# Patient Record
Sex: MALE | Race: WHITE | NOT HISPANIC OR LATINO | Employment: OTHER | URBAN - METROPOLITAN AREA
[De-identification: names, ages, dates, MRNs, and addresses within clinical notes are randomized per-mention and may not be internally consistent; named-entity substitution may affect disease eponyms.]

---

## 2020-08-13 ENCOUNTER — HOSPITAL ENCOUNTER (INPATIENT)
Facility: HOSPITAL | Age: 65
LOS: 7 days | Discharge: NURSING, REHAB OR LONG-TERM CARE | End: 2020-08-20
Attending: EMERGENCY MEDICINE | Admitting: INTERNAL MEDICINE

## 2020-08-13 ENCOUNTER — APPOINTMENT (OUTPATIENT)
Dept: CT IMAGING | Facility: HOSPITAL | Age: 65
End: 2020-08-13

## 2020-08-13 DIAGNOSIS — I71.21 ASCENDING AORTIC ANEURYSM (HCC): ICD-10-CM

## 2020-08-13 DIAGNOSIS — J96.02 ACUTE RESPIRATORY FAILURE WITH HYPOXIA AND HYPERCAPNIA (HCC): ICD-10-CM

## 2020-08-13 DIAGNOSIS — U07.1 COVID-19: Primary | ICD-10-CM

## 2020-08-13 DIAGNOSIS — J96.01 ACUTE RESPIRATORY FAILURE WITH HYPOXIA AND HYPERCAPNIA (HCC): ICD-10-CM

## 2020-08-13 PROBLEM — E55.9 VITAMIN D DEFICIENCY: Chronic | Status: ACTIVE | Noted: 2020-08-13

## 2020-08-13 PROBLEM — E66.01 OBESITY, MORBID, BMI 50 OR HIGHER (HCC): Chronic | Status: ACTIVE | Noted: 2020-08-13

## 2020-08-13 PROBLEM — G62.9 POLYNEUROPATHY: Chronic | Status: ACTIVE | Noted: 2020-08-13

## 2020-08-13 PROBLEM — I10 ESSENTIAL HYPERTENSION: Chronic | Status: ACTIVE | Noted: 2020-08-13

## 2020-08-13 PROBLEM — R06.02 SHORTNESS OF BREATH: Status: ACTIVE | Noted: 2020-08-13

## 2020-08-13 PROBLEM — F31.9 BIPOLAR 1 DISORDER (HCC): Chronic | Status: ACTIVE | Noted: 2020-08-13

## 2020-08-13 PROBLEM — I89.0 LYMPHEDEMA: Chronic | Status: ACTIVE | Noted: 2020-08-13

## 2020-08-13 LAB
ALBUMIN SERPL-MCNC: 3.5 G/DL (ref 3.5–5.2)
ALBUMIN/GLOB SERPL: 1.2 G/DL
ALP SERPL-CCNC: 40 U/L (ref 39–117)
ALT SERPL W P-5'-P-CCNC: 21 U/L (ref 1–41)
ANION GAP SERPL CALCULATED.3IONS-SCNC: 6 MMOL/L (ref 5–15)
APTT PPP: 29.3 SECONDS (ref 24–31)
ARTERIAL PATENCY WRIST A: POSITIVE
ARTERIAL PATENCY WRIST A: POSITIVE
AST SERPL-CCNC: 19 U/L (ref 1–40)
ATMOSPHERIC PRESS: ABNORMAL MM[HG]
ATMOSPHERIC PRESS: ABNORMAL MM[HG]
B PARAPERT DNA SPEC QL NAA+PROBE: NOT DETECTED
B PERT DNA SPEC QL NAA+PROBE: NOT DETECTED
BASE EXCESS BLDA CALC-SCNC: 8.1 MMOL/L (ref 0–3)
BASE EXCESS BLDA CALC-SCNC: 9.6 MMOL/L (ref 0–3)
BASOPHILS # BLD AUTO: 0.1 10*3/MM3 (ref 0–0.2)
BASOPHILS NFR BLD AUTO: 1.5 % (ref 0–1.5)
BDY SITE: ABNORMAL
BDY SITE: ABNORMAL
BILIRUB SERPL-MCNC: 0.4 MG/DL (ref 0–1.2)
BUN SERPL-MCNC: 17 MG/DL (ref 8–23)
BUN SERPL-MCNC: ABNORMAL MG/DL
BUN/CREAT SERPL: ABNORMAL
C PNEUM DNA NPH QL NAA+NON-PROBE: NOT DETECTED
CALCIUM SPEC-SCNC: 8.3 MG/DL (ref 8.6–10.5)
CHLORIDE SERPL-SCNC: 99 MMOL/L (ref 98–107)
CK SERPL-CCNC: 45 U/L (ref 20–200)
CO2 BLDA-SCNC: 41.8 MMOL/L (ref 22–29)
CO2 BLDA-SCNC: 43.1 MMOL/L (ref 22–29)
CO2 SERPL-SCNC: 36 MMOL/L (ref 22–29)
CREAT SERPL-MCNC: 0.78 MG/DL (ref 0.76–1.27)
CRP SERPL-MCNC: 0.47 MG/DL (ref 0–0.5)
D DIMER PPP FEU-MCNC: 1.19 MG/L (FEU) (ref 0–0.59)
DEPRECATED RDW RBC AUTO: 45.5 FL (ref 37–54)
EOSINOPHIL # BLD AUTO: 0.2 10*3/MM3 (ref 0–0.4)
EOSINOPHIL NFR BLD AUTO: 3.3 % (ref 0.3–6.2)
ERYTHROCYTE [DISTWIDTH] IN BLOOD BY AUTOMATED COUNT: 13.9 % (ref 12.3–15.4)
FERRITIN SERPL-MCNC: 59.8 NG/ML (ref 30–400)
FLUAV H1 2009 PAND RNA NPH QL NAA+PROBE: NOT DETECTED
FLUAV H1 HA GENE NPH QL NAA+PROBE: NOT DETECTED
FLUAV H3 RNA NPH QL NAA+PROBE: NOT DETECTED
FLUAV SUBTYP SPEC NAA+PROBE: NOT DETECTED
FLUBV RNA ISLT QL NAA+PROBE: NOT DETECTED
GFR SERPL CREATININE-BSD FRML MDRD: 100 ML/MIN/1.73
GLOBULIN UR ELPH-MCNC: 3 GM/DL
GLUCOSE BLDC GLUCOMTR-MCNC: 114 MG/DL (ref 70–105)
GLUCOSE BLDC GLUCOMTR-MCNC: 126 MG/DL (ref 70–105)
GLUCOSE BLDC GLUCOMTR-MCNC: 132 MG/DL (ref 70–105)
GLUCOSE BLDC GLUCOMTR-MCNC: 133 MG/DL (ref 70–105)
GLUCOSE SERPL-MCNC: 97 MG/DL (ref 65–99)
HADV DNA SPEC NAA+PROBE: NOT DETECTED
HBA1C MFR BLD: 5.6 % (ref 3.5–5.6)
HCO3 BLDA-SCNC: 39.3 MMOL/L (ref 21–28)
HCO3 BLDA-SCNC: 40.6 MMOL/L (ref 21–28)
HCOV 229E RNA SPEC QL NAA+PROBE: NOT DETECTED
HCOV HKU1 RNA SPEC QL NAA+PROBE: NOT DETECTED
HCOV NL63 RNA SPEC QL NAA+PROBE: NOT DETECTED
HCOV OC43 RNA SPEC QL NAA+PROBE: NOT DETECTED
HCT VFR BLD AUTO: 47.5 % (ref 37.5–51)
HEMODILUTION: NO
HEMODILUTION: NO
HGB BLD-MCNC: 15.4 G/DL (ref 13–17.7)
HMPV RNA NPH QL NAA+NON-PROBE: NOT DETECTED
HPIV1 RNA SPEC QL NAA+PROBE: NOT DETECTED
HPIV2 RNA SPEC QL NAA+PROBE: NOT DETECTED
HPIV3 RNA NPH QL NAA+PROBE: NOT DETECTED
HPIV4 P GENE NPH QL NAA+PROBE: NOT DETECTED
INHALED O2 CONCENTRATION: 36 %
INHALED O2 CONCENTRATION: 60 %
INR PPP: 1 (ref 0.93–1.1)
INSPIRATORY TIME: 1
L PNEUMO1 AG UR QL IA: NEGATIVE
LDH SERPL-CCNC: 205 U/L (ref 135–225)
LYMPHOCYTES # BLD AUTO: 1.5 10*3/MM3 (ref 0.7–3.1)
LYMPHOCYTES NFR BLD AUTO: 27.1 % (ref 19.6–45.3)
M PNEUMO IGG SER IA-ACNC: NOT DETECTED
MCH RBC QN AUTO: 30.1 PG (ref 26.6–33)
MCHC RBC AUTO-ENTMCNC: 32.5 G/DL (ref 31.5–35.7)
MCV RBC AUTO: 92.7 FL (ref 79–97)
MODALITY: ABNORMAL
MODALITY: ABNORMAL
MONOCYTES # BLD AUTO: 0.4 10*3/MM3 (ref 0.1–0.9)
MONOCYTES NFR BLD AUTO: 7.2 % (ref 5–12)
NEUTROPHILS NFR BLD AUTO: 3.4 10*3/MM3 (ref 1.7–7)
NEUTROPHILS NFR BLD AUTO: 60.9 % (ref 42.7–76)
NRBC BLD AUTO-RTO: 0.1 /100 WBC (ref 0–0.2)
NT-PROBNP SERPL-MCNC: 37.2 PG/ML (ref 0–900)
NT-PROBNP SERPL-MCNC: 40.4 PG/ML (ref 0–900)
PCO2 BLDA: 81.1 MM HG (ref 35–48)
PCO2 BLDA: 82.7 MM HG (ref 35–48)
PH BLDA: 7.29 PH UNITS (ref 7.35–7.45)
PH BLDA: 7.31 PH UNITS (ref 7.35–7.45)
PLATELET # BLD AUTO: 226 10*3/MM3 (ref 140–450)
PMV BLD AUTO: 8.2 FL (ref 6–12)
PO2 BLDA: 86.8 MM HG (ref 83–108)
PO2 BLDA: 94.8 MM HG (ref 83–108)
POTASSIUM SERPL-SCNC: 4.6 MMOL/L (ref 3.5–5.2)
PROT SERPL-MCNC: 6.5 G/DL (ref 6–8.5)
PROTHROMBIN TIME: 10.9 SECONDS (ref 9.6–11.7)
RBC # BLD AUTO: 5.12 10*6/MM3 (ref 4.14–5.8)
RESPIRATORY RATE: 24
RHINOVIRUS RNA SPEC NAA+PROBE: NOT DETECTED
RSV RNA NPH QL NAA+NON-PROBE: NOT DETECTED
S PNEUM AG SPEC QL LA: NEGATIVE
SAO2 % BLDCOA: 94.8 % (ref 94–98)
SAO2 % BLDCOA: 95.7 % (ref 94–98)
SARS-COV-2 RNA PNL SPEC NAA+PROBE: DETECTED
SODIUM SERPL-SCNC: 141 MMOL/L (ref 136–145)
TROPONIN T SERPL-MCNC: <0.01 NG/ML (ref 0–0.03)
VENTILATOR MODE: ABNORMAL
WBC # BLD AUTO: 5.7 10*3/MM3 (ref 3.4–10.8)

## 2020-08-13 PROCEDURE — 94640 AIRWAY INHALATION TREATMENT: CPT

## 2020-08-13 PROCEDURE — 94660 CPAP INITIATION&MGMT: CPT

## 2020-08-13 PROCEDURE — 83880 ASSAY OF NATRIURETIC PEPTIDE: CPT | Performed by: INTERNAL MEDICINE

## 2020-08-13 PROCEDURE — 83036 HEMOGLOBIN GLYCOSYLATED A1C: CPT | Performed by: NURSE PRACTITIONER

## 2020-08-13 PROCEDURE — 94799 UNLISTED PULMONARY SVC/PX: CPT

## 2020-08-13 PROCEDURE — 85025 COMPLETE CBC W/AUTO DIFF WBC: CPT | Performed by: EMERGENCY MEDICINE

## 2020-08-13 PROCEDURE — 36600 WITHDRAWAL OF ARTERIAL BLOOD: CPT

## 2020-08-13 PROCEDURE — 87899 AGENT NOS ASSAY W/OPTIC: CPT | Performed by: NURSE PRACTITIONER

## 2020-08-13 PROCEDURE — 84484 ASSAY OF TROPONIN QUANT: CPT | Performed by: EMERGENCY MEDICINE

## 2020-08-13 PROCEDURE — 82803 BLOOD GASES ANY COMBINATION: CPT

## 2020-08-13 PROCEDURE — 25010000002 FUROSEMIDE PER 20 MG: Performed by: NURSE PRACTITIONER

## 2020-08-13 PROCEDURE — 25010000002 DEXAMETHASONE PER 1 MG: Performed by: EMERGENCY MEDICINE

## 2020-08-13 PROCEDURE — 0202U NFCT DS 22 TRGT SARS-COV-2: CPT | Performed by: INTERNAL MEDICINE

## 2020-08-13 PROCEDURE — 82550 ASSAY OF CK (CPK): CPT | Performed by: NURSE PRACTITIONER

## 2020-08-13 PROCEDURE — 83880 ASSAY OF NATRIURETIC PEPTIDE: CPT | Performed by: EMERGENCY MEDICINE

## 2020-08-13 PROCEDURE — 71275 CT ANGIOGRAPHY CHEST: CPT

## 2020-08-13 PROCEDURE — 0 IOPAMIDOL PER 1 ML: Performed by: EMERGENCY MEDICINE

## 2020-08-13 PROCEDURE — 99223 1ST HOSP IP/OBS HIGH 75: CPT | Performed by: INTERNAL MEDICINE

## 2020-08-13 PROCEDURE — 99284 EMERGENCY DEPT VISIT MOD MDM: CPT

## 2020-08-13 PROCEDURE — 25010000002 ENOXAPARIN PER 10 MG: Performed by: NURSE PRACTITIONER

## 2020-08-13 PROCEDURE — 82728 ASSAY OF FERRITIN: CPT | Performed by: NURSE PRACTITIONER

## 2020-08-13 PROCEDURE — 85730 THROMBOPLASTIN TIME PARTIAL: CPT | Performed by: EMERGENCY MEDICINE

## 2020-08-13 PROCEDURE — 80053 COMPREHEN METABOLIC PANEL: CPT | Performed by: EMERGENCY MEDICINE

## 2020-08-13 PROCEDURE — 83615 LACTATE (LD) (LDH) ENZYME: CPT | Performed by: NURSE PRACTITIONER

## 2020-08-13 PROCEDURE — 85379 FIBRIN DEGRADATION QUANT: CPT | Performed by: EMERGENCY MEDICINE

## 2020-08-13 PROCEDURE — 86140 C-REACTIVE PROTEIN: CPT | Performed by: NURSE PRACTITIONER

## 2020-08-13 PROCEDURE — 85610 PROTHROMBIN TIME: CPT | Performed by: EMERGENCY MEDICINE

## 2020-08-13 PROCEDURE — 82962 GLUCOSE BLOOD TEST: CPT

## 2020-08-13 PROCEDURE — 93005 ELECTROCARDIOGRAM TRACING: CPT

## 2020-08-13 RX ORDER — CYCLOBENZAPRINE HCL 5 MG
5 TABLET ORAL 2 TIMES DAILY PRN
COMMUNITY

## 2020-08-13 RX ORDER — HYDRALAZINE HYDROCHLORIDE 20 MG/ML
10 INJECTION INTRAMUSCULAR; INTRAVENOUS EVERY 6 HOURS PRN
Status: DISCONTINUED | OUTPATIENT
Start: 2020-08-13 | End: 2020-08-20 | Stop reason: HOSPADM

## 2020-08-13 RX ORDER — FUROSEMIDE 10 MG/ML
40 INJECTION INTRAMUSCULAR; INTRAVENOUS DAILY
Status: DISCONTINUED | OUTPATIENT
Start: 2020-08-13 | End: 2020-08-15

## 2020-08-13 RX ORDER — AMLODIPINE BESYLATE 10 MG/1
10 TABLET ORAL DAILY
COMMUNITY

## 2020-08-13 RX ORDER — ZINC OXIDE 20 %
OINTMENT (GRAM) TOPICAL DAILY
COMMUNITY

## 2020-08-13 RX ORDER — ACETAMINOPHEN 325 MG/1
650 TABLET ORAL EVERY 4 HOURS PRN
Status: DISCONTINUED | OUTPATIENT
Start: 2020-08-13 | End: 2020-08-20 | Stop reason: HOSPADM

## 2020-08-13 RX ORDER — MAGNESIUM SULFATE HEPTAHYDRATE 40 MG/ML
2 INJECTION, SOLUTION INTRAVENOUS AS NEEDED
Status: DISCONTINUED | OUTPATIENT
Start: 2020-08-13 | End: 2020-08-20 | Stop reason: HOSPADM

## 2020-08-13 RX ORDER — ACETAMINOPHEN 160 MG/5ML
650 SOLUTION ORAL EVERY 4 HOURS PRN
Status: DISCONTINUED | OUTPATIENT
Start: 2020-08-13 | End: 2020-08-20 | Stop reason: HOSPADM

## 2020-08-13 RX ORDER — FUROSEMIDE 20 MG/1
20 TABLET ORAL DAILY
COMMUNITY

## 2020-08-13 RX ORDER — ALBUTEROL SULFATE 90 UG/1
2 AEROSOL, METERED RESPIRATORY (INHALATION)
Status: DISCONTINUED | OUTPATIENT
Start: 2020-08-13 | End: 2020-08-20 | Stop reason: HOSPADM

## 2020-08-13 RX ORDER — ALUMINA, MAGNESIA, AND SIMETHICONE 2400; 2400; 240 MG/30ML; MG/30ML; MG/30ML
15 SUSPENSION ORAL EVERY 6 HOURS PRN
Status: DISCONTINUED | OUTPATIENT
Start: 2020-08-13 | End: 2020-08-20 | Stop reason: HOSPADM

## 2020-08-13 RX ORDER — DIVALPROEX SODIUM 125 MG/1
750 TABLET, DELAYED RELEASE ORAL 3 TIMES DAILY
COMMUNITY

## 2020-08-13 RX ORDER — POTASSIUM CHLORIDE 7.45 MG/ML
10 INJECTION INTRAVENOUS
Status: DISCONTINUED | OUTPATIENT
Start: 2020-08-13 | End: 2020-08-20 | Stop reason: HOSPADM

## 2020-08-13 RX ORDER — NICOTINE POLACRILEX 4 MG
15 LOZENGE BUCCAL
Status: DISCONTINUED | OUTPATIENT
Start: 2020-08-13 | End: 2020-08-20 | Stop reason: HOSPADM

## 2020-08-13 RX ORDER — CHOLECALCIFEROL (VITAMIN D3) 125 MCG
5 CAPSULE ORAL NIGHTLY PRN
Status: DISCONTINUED | OUTPATIENT
Start: 2020-08-13 | End: 2020-08-20 | Stop reason: HOSPADM

## 2020-08-13 RX ORDER — NITROGLYCERIN 0.4 MG/1
0.4 TABLET SUBLINGUAL
Status: DISCONTINUED | OUTPATIENT
Start: 2020-08-13 | End: 2020-08-20 | Stop reason: HOSPADM

## 2020-08-13 RX ORDER — LISINOPRIL 40 MG/1
40 TABLET ORAL DAILY
COMMUNITY

## 2020-08-13 RX ORDER — KETOROLAC TROMETHAMINE 15 MG/ML
15 INJECTION, SOLUTION INTRAMUSCULAR; INTRAVENOUS EVERY 6 HOURS PRN
Status: ACTIVE | OUTPATIENT
Start: 2020-08-13 | End: 2020-08-15

## 2020-08-13 RX ORDER — ONDANSETRON 4 MG/1
4 TABLET, FILM COATED ORAL EVERY 6 HOURS PRN
Status: DISCONTINUED | OUTPATIENT
Start: 2020-08-13 | End: 2020-08-20 | Stop reason: HOSPADM

## 2020-08-13 RX ORDER — QUETIAPINE 200 MG/1
200 TABLET, FILM COATED, EXTENDED RELEASE ORAL NIGHTLY
COMMUNITY
End: 2020-08-20 | Stop reason: HOSPADM

## 2020-08-13 RX ORDER — METFORMIN HYDROCHLORIDE 500 MG/1
500 TABLET, EXTENDED RELEASE ORAL
COMMUNITY

## 2020-08-13 RX ORDER — DEXAMETHASONE SODIUM PHOSPHATE 4 MG/ML
6 INJECTION, SOLUTION INTRA-ARTICULAR; INTRALESIONAL; INTRAMUSCULAR; INTRAVENOUS; SOFT TISSUE ONCE
Status: COMPLETED | OUTPATIENT
Start: 2020-08-13 | End: 2020-08-13

## 2020-08-13 RX ORDER — SODIUM CHLORIDE 0.9 % (FLUSH) 0.9 %
10 SYRINGE (ML) INJECTION EVERY 12 HOURS SCHEDULED
Status: DISCONTINUED | OUTPATIENT
Start: 2020-08-13 | End: 2020-08-20 | Stop reason: HOSPADM

## 2020-08-13 RX ORDER — GABAPENTIN 600 MG/1
600 TABLET ORAL 3 TIMES DAILY
COMMUNITY

## 2020-08-13 RX ORDER — BISACODYL 10 MG
10 SUPPOSITORY, RECTAL RECTAL DAILY PRN
Status: DISCONTINUED | OUTPATIENT
Start: 2020-08-13 | End: 2020-08-20 | Stop reason: HOSPADM

## 2020-08-13 RX ORDER — ONDANSETRON 2 MG/ML
4 INJECTION INTRAMUSCULAR; INTRAVENOUS EVERY 6 HOURS PRN
Status: DISCONTINUED | OUTPATIENT
Start: 2020-08-13 | End: 2020-08-20 | Stop reason: HOSPADM

## 2020-08-13 RX ORDER — ZINC OXIDE 20 %
OINTMENT (GRAM) TOPICAL DAILY
Status: DISCONTINUED | OUTPATIENT
Start: 2020-08-13 | End: 2020-08-14

## 2020-08-13 RX ORDER — BUDESONIDE AND FORMOTEROL FUMARATE DIHYDRATE 160; 4.5 UG/1; UG/1
2 AEROSOL RESPIRATORY (INHALATION)
Status: DISCONTINUED | OUTPATIENT
Start: 2020-08-13 | End: 2020-08-20 | Stop reason: HOSPADM

## 2020-08-13 RX ORDER — SODIUM CHLORIDE 0.9 % (FLUSH) 0.9 %
10 SYRINGE (ML) INJECTION AS NEEDED
Status: DISCONTINUED | OUTPATIENT
Start: 2020-08-13 | End: 2020-08-20 | Stop reason: HOSPADM

## 2020-08-13 RX ORDER — ACETAMINOPHEN 650 MG/1
650 SUPPOSITORY RECTAL EVERY 4 HOURS PRN
Status: DISCONTINUED | OUTPATIENT
Start: 2020-08-13 | End: 2020-08-20 | Stop reason: HOSPADM

## 2020-08-13 RX ORDER — CELECOXIB 100 MG/1
50 CAPSULE ORAL 2 TIMES DAILY
COMMUNITY

## 2020-08-13 RX ORDER — DEXTROSE MONOHYDRATE 25 G/50ML
25 INJECTION, SOLUTION INTRAVENOUS
Status: DISCONTINUED | OUTPATIENT
Start: 2020-08-13 | End: 2020-08-20 | Stop reason: HOSPADM

## 2020-08-13 RX ORDER — POTASSIUM CHLORIDE 1.5 G/1.77G
40 POWDER, FOR SOLUTION ORAL AS NEEDED
Status: DISCONTINUED | OUTPATIENT
Start: 2020-08-13 | End: 2020-08-20 | Stop reason: HOSPADM

## 2020-08-13 RX ORDER — QUETIAPINE FUMARATE 100 MG/1
200 TABLET, FILM COATED ORAL 2 TIMES DAILY
COMMUNITY

## 2020-08-13 RX ORDER — ALBUTEROL SULFATE 90 UG/1
2 AEROSOL, METERED RESPIRATORY (INHALATION) EVERY 4 HOURS PRN
Status: DISCONTINUED | OUTPATIENT
Start: 2020-08-13 | End: 2020-08-20 | Stop reason: HOSPADM

## 2020-08-13 RX ORDER — MAGNESIUM SULFATE 1 G/100ML
1 INJECTION INTRAVENOUS AS NEEDED
Status: DISCONTINUED | OUTPATIENT
Start: 2020-08-13 | End: 2020-08-20 | Stop reason: HOSPADM

## 2020-08-13 RX ORDER — DEXAMETHASONE SODIUM PHOSPHATE 10 MG/ML
6 INJECTION, SOLUTION INTRAMUSCULAR; INTRAVENOUS DAILY
Status: DISCONTINUED | OUTPATIENT
Start: 2020-08-14 | End: 2020-08-18

## 2020-08-13 RX ORDER — POTASSIUM CHLORIDE 20 MEQ/1
40 TABLET, EXTENDED RELEASE ORAL AS NEEDED
Status: DISCONTINUED | OUTPATIENT
Start: 2020-08-13 | End: 2020-08-20 | Stop reason: HOSPADM

## 2020-08-13 RX ADMIN — Medication 10 ML: at 10:42

## 2020-08-13 RX ADMIN — ALBUTEROL SULFATE 2 PUFF: 90 AEROSOL, METERED RESPIRATORY (INHALATION) at 11:50

## 2020-08-13 RX ADMIN — BUDESONIDE AND FORMOTEROL FUMARATE DIHYDRATE 2 PUFF: 160; 4.5 AEROSOL RESPIRATORY (INHALATION) at 20:30

## 2020-08-13 RX ADMIN — IOPAMIDOL 100 ML: 755 INJECTION, SOLUTION INTRAVENOUS at 04:30

## 2020-08-13 RX ADMIN — BUDESONIDE AND FORMOTEROL FUMARATE DIHYDRATE 2 PUFF: 160; 4.5 AEROSOL RESPIRATORY (INHALATION) at 11:50

## 2020-08-13 RX ADMIN — Medication 10 ML: at 22:05

## 2020-08-13 RX ADMIN — ZINC OXIDE: 200 OINTMENT TOPICAL at 22:06

## 2020-08-13 RX ADMIN — FUROSEMIDE 40 MG: 10 INJECTION, SOLUTION INTRAMUSCULAR; INTRAVENOUS at 10:42

## 2020-08-13 RX ADMIN — ENOXAPARIN SODIUM 90 MG: 100 INJECTION SUBCUTANEOUS at 10:41

## 2020-08-13 RX ADMIN — ALBUTEROL SULFATE 2 PUFF: 90 AEROSOL, METERED RESPIRATORY (INHALATION) at 19:00

## 2020-08-13 RX ADMIN — DEXAMETHASONE SODIUM PHOSPHATE 6 MG: 4 INJECTION, SOLUTION INTRAMUSCULAR; INTRAVENOUS at 05:36

## 2020-08-13 RX ADMIN — ENOXAPARIN SODIUM 90 MG: 100 INJECTION SUBCUTANEOUS at 22:03

## 2020-08-13 RX ADMIN — ALBUTEROL SULFATE 2 PUFF: 90 AEROSOL, METERED RESPIRATORY (INHALATION) at 16:04

## 2020-08-13 NOTE — CONSULTS
PULMONARY/ CRITICAL CARE/ SLEEP MEDICINE CONSULT NOTE        Patient Name:  Fox Ramirez    :  1955    Medical Record:  [id number]    REQUESTING PHYSICIAN    Steve Valerio MD    PRIMARY CARE PHYSICIAN     Steve Valerio MD    REASON FOR CONSULTATION    Fox Ramirez is a 65 y.o. male who was referred for consultation for shortness of breath and respiratory failure.  Patient is a morbidly obese male with remote history of tobacco use but he denies any previous history of COPD or emphysema.  Patient was recently diagnosed with COVID-19 infection and in [place] a few weeks ago.  He subsequently was transferred to a local nursing home.  Patient was noted to have some low O2 sats yesterday.  He was placed on 3 L nasal cannula and was subsequently transferred to the ER for further evaluation.  Patient was evaluated in the ER.  He required BiPAP therapy and was admitted to the hospital.  I have been asked to see him for further evaluation.  At time of my evaluation patient awakens easily.  He does complain of some shortness of breath.  He does complain of some cough.  He does complain of some nausea and diarrhea.  He denies any chest pain or palpitations.  He denies any vomiting.  His shortness of breath gets worse with any activity.  It gets better with resting and use of supplemental oxygen    REVIEW OF SYSTEMS    Constitutional:  Denies fever or chills   Eyes:  Denies change in visual acuity   HENT:  Denies nasal congestion or sore throat   Respiratory:  + cough or shortness of breath   Cardiovascular:  Denies chest pain + edema   GI:  Denies abdominal pain,vomiting, bloody stools+ diarrhea   :  Denies dysuria   Musculoskeletal:  +back pain + joint pain   Integument:  Denies rash   Neurologic:  Denies headache, focal weakness or sensory changes   Endocrine:  Denies polyuria or polydipsia   Lymphatic:  Denies swollen glands   Psychiatric:  Denies depression or anxiety     MEDICAL HISTORY    Past  Medical History:   Diagnosis Date   • Bipolar 1 disorder (CMS/[facility])    • Hypertension    • Lymphedema    • Obesity, morbid, BMI 50 or higher (CMS/[facility]) 2020   • Polyneuropathy    • Vitamin D deficiency         SURGICAL HISTORY    History reviewed. No pertinent surgical history.     FAMILY HISTORY    Family History   Family history unknown: Yes       SOCIAL HISTORY    Social History     Tobacco Use   • Smoking status: Unknown If Ever Smoked   Substance Use Topics   • Alcohol use: Defer        ALLERGIES    Allergies   Allergen Reactions   • Morphine Nausea And Vomiting       MEDICATIONS    Scheduled Meds:  albuterol sulfate HFA 2 puff Inhalation 4x Daily - RT   budesonide-formoterol 2 puff Inhalation BID - RT   [START ON 2020] dexamethasone 6 mg Intravenous Daily   enoxaparin 0.5 mg/kg Subcutaneous Q12H   furosemide 40 mg Intravenous Daily   insulin lispro 0-9 Units Subcutaneous Q6H   sodium chloride 10 mL Intravenous Q12H   zinc oxide  Topical Daily     Continuous Infusions:  Pharmacy Consult - Steroid Insulin Protocol      PRN Meds:.•  acetaminophen **OR** acetaminophen **OR** acetaminophen  •  albuterol sulfate HFA  •  aluminum-magnesium hydroxide-simethicone  •  bisacodyl  •  dextrose  •  dextrose  •  glucagon (human recombinant)  •  hydrALAZINE  •  insulin lispro **AND** insulin lispro  •  ketorolac  •  magnesium hydroxide  •  magnesium sulfate **OR** magnesium sulfate in D5W 1g/100mL (PREMIX)  •  melatonin  •  nitroglycerin  •  ondansetron **OR** ondansetron  •  Pharmacy Consult - Steroid Insulin Protocol  •  potassium chloride **OR** potassium chloride **OR** potassium chloride  •  sodium chloride      PHYSICAL EXAM    tMax 24 hrs:  Temp (24hrs), Av.8 °F (36.6 °C), Min:97.1 °F (36.2 °C), Max:98.4 °F (36.9 °C)    Vitals Ranges:  Temp:  [97.1 °F (36.2 °C)-98.4 °F (36.9 °C)] 97.1 °F (36.2 °C)  Heart Rate:  [65-80] 65  Resp:  [16-20] 18  BP: (124-139)/(61-78) 125/78  Intake and Output Last 3 Shifts:  No  intake/output data recorded.    Constitutional: Morbidly obese, chronically ill-appearing, no acute distress, non-toxic appearance   Eyes:   conjunctiva normal   HENT:  Atraumatic, external ears normal, nose normal, oropharynx moist, no pharyngeal exudates.   Neck- normal range of motion, no tenderness, supple   Respiratory:  No respiratory distress, normal breath sounds, no rales, no wheezing   Cardiovascular:  Normal rate, normal rhythm, no murmurs, no gallops, no rubs   GI:  Soft, nondistended, normal bowel sounds, nontender, no organomegaly, no mass, no rebound, no guarding   :  No costovertebral angle tenderness   Musculoskeletal:  + edema, no tenderness, no deformities. Back- no tenderness  Integument:  Well hydrated, no rash   Lymphatic:  No lymphadenopathy noted   Neurologic: Awake, CN 2-12 normal, normal motor function, normal sensory function, no focal deficits noted   Psychiatric:  Speech and behavior appropriate     LABS    Lab Results (last 24 hours)     Procedure Component Value Units Date/Time    POC Glucose Once [[id number]]  (Abnormal) Collected:  08/13/20 0852    Specimen:  Blood Updated:  08/13/20 0853     Glucose 114 mg/dL      Comment: Serial Number: [id number]:  [id number]       Hemoglobin A1c [[id number]] Collected:  08/13/20 0318    Specimen:  Blood Updated:  08/13/20 0852    Blood Gas, Arterial [[id number]]  (Abnormal) Collected:  08/13/20 0815    Specimen:  Arterial Blood Updated:  08/13/20 0825     Site Right Radial     Janes's Test Positive     pH, Arterial 7.285 pH units      pCO2, Arterial 82.7 mm Hg      pO2, Arterial 94.8 mm Hg      HCO3, Arterial 39.3 mmol/L      Base Excess, Arterial 8.1 mmol/L      Comment: Serial Number: [id number]:  [id number]        O2 Saturation, Arterial 95.7 %      CO2 Content 41.8 mmol/L      Barometric Pressure for Blood Gas --     Comment: N/A        Modality BiPap     FIO2 60 %      Ventilator Mode ;BiLevel     Hemodilution No     Respiratory Rate 24       Inspiratory Time 1    Ferritin [[id number]]  (Normal) Collected:  08/13/20 0318    Specimen:  Blood Updated:  08/13/20 0805     Ferritin 59.80 ng/mL     Narrative:       Results may be falsely decreased if patient taking Biotin.      CK [[id number]]  (Normal) Collected:  08/13/20 0318    Specimen:  Blood Updated:  08/13/20 0754     Creatine Kinase 45 U/L     Lactate Dehydrogenase [[id number]] Collected:  08/13/20 0318    Specimen:  Blood Updated:  08/13/20 0740    C-reactive Protein [[id number]] Collected:  08/13/20 0318    Specimen:  Blood Updated:  08/13/20 0740    BNP [[id number]]  (Normal) Collected:  08/13/20 0318    Specimen:  Blood Updated:  08/13/20 0358     proBNP 40.4 pg/mL     Narrative:       Among patients with dyspnea, NT-proBNP is highly sensitive for the detection of acute congestive heart failure. In addition NT-proBNP of <300 pg/ml effectively rules out acute congestive heart failure with 99% negative predictive value.    Results may be falsely decreased if patient taking Biotin.      Troponin [[id number]]  (Normal) Collected:  08/13/20 0318    Specimen:  Blood Updated:  08/13/20 0358     Troponin T <0.010 ng/mL     Narrative:       Troponin T Reference Range:  <= 0.03 ng/mL-   Negative for AMI  >0.03 ng/mL-     Abnormal for myocardial necrosis.  Clinicians would have to utilize clinical acumen, EKG, Troponin and serial changes to determine if it is an Acute Myocardial Infarction or myocardial injury due to an underlying chronic condition.       Results may be falsely decreased if patient taking Biotin.      BUN [[id number]]  (Normal) Collected:  08/13/20 0318    Specimen:  Blood Updated:  08/13/20 0356     BUN 17 mg/dL     Blood Gas, Arterial [[id number]]  (Abnormal) Collected:  08/13/20 0343    Specimen:  Arterial Blood Updated:  08/13/20 0355     Site Right Radial     Janes's Test Positive     pH, Arterial 7.308 pH units      pCO2, Arterial 81.1 mm Hg      pO2, Arterial 86.8 mm Hg      HCO3,  Arterial 40.6 mmol/L      Base Excess, Arterial 9.6 mmol/L      Comment: Serial Number: [id number]:  [id number]        O2 Saturation, Arterial 94.8 %      CO2 Content 43.1 mmol/L      Barometric Pressure for Blood Gas --     Comment: N/A        Modality Cannula     FIO2 36 %      Hemodilution No    Comprehensive Metabolic Panel [[id number]]  (Abnormal) Collected:  08/13/20 0318    Specimen:  Blood Updated:  08/13/20 0354     Glucose 97 mg/dL      BUN --     Comment: Testing performed by alternate method        Creatinine 0.78 mg/dL      Sodium 141 mmol/L      Potassium 4.6 mmol/L      Chloride 99 mmol/L      CO2 36.0 mmol/L      Calcium 8.3 mg/dL      Total Protein 6.5 g/dL      Albumin 3.50 g/dL      ALT (SGPT) 21 U/L      AST (SGOT) 19 U/L      Alkaline Phosphatase 40 U/L      Total Bilirubin 0.4 mg/dL      eGFR Non African Amer 100 mL/min/1.73      Globulin 3.0 gm/dL      A/G Ratio 1.2 g/dL      BUN/Creatinine Ratio --     Comment: Testing not performed        Anion Gap 6.0 mmol/L     Narrative:       GFR Normal >60  Chronic Kidney Disease <60  Kidney Failure <15      Protime-INR [[id number]]  (Normal) Collected:  08/13/20 0318    Specimen:  Blood Updated:  08/13/20 0340     Protime 10.9 Seconds      INR 1.00    aPTT [[id number]]  (Normal) Collected:  08/13/20 0318    Specimen:  Blood Updated:  08/13/20 0340     PTT 29.3 seconds     D-dimer, Quantitative [[id number]]  (Abnormal) Collected:  08/13/20 0318    Specimen:  Blood Updated:  08/13/20 0340     D-Dimer, Quantitative 1.19 mg/L (FEU)     Narrative:       Reference Range  --------------------------------------------------------------------     < 0.50   Negative Predictive Value  0.50-0.59   Indeterminate    >= 0.60   Probable VTE             A very low percentage of patients with DVT may yield D-Dimer results   below the cut-off of 0.50 mg/L FEU.  This is known to be more   prevalent in patients with distal DVT.             Results of this test should always be  interpreted in conjunction with   the patient's medical history, clinical presentation and other   findings.  Clinical diagnosis should not be based on the result of   [place] D-Dimer alone.    CBC & Differential [[id number]] Collected:  08/13/20 0318    Specimen:  Blood Updated:  08/13/20 0333    Narrative:       The following orders were created for panel order CBC & Differential.  Procedure                               Abnormality         Status                     ---------                               -----------         ------                     CBC Auto Differential[[id number]]        Normal              Final result                 Please view results for these tests on the individual orders.    CBC Auto Differential [[id number]]  (Normal) Collected:  08/13/20 0318    Specimen:  Blood Updated:  08/13/20 0333     WBC 5.70 10*3/mm3      RBC 5.12 10*6/mm3      Hemoglobin 15.4 g/dL      Hematocrit 47.5 %      MCV 92.7 fL      MCH 30.1 pg      MCHC 32.5 g/dL      RDW 13.9 %      RDW-SD 45.5 fl      MPV 8.2 fL      Platelets 226 10*3/mm3      Neutrophil % 60.9 %      Lymphocyte % 27.1 %      Monocyte % 7.2 %      Eosinophil % 3.3 %      Basophil % 1.5 %      Neutrophils, Absolute 3.40 10*3/mm3      Lymphocytes, Absolute 1.50 10*3/mm3      Monocytes, Absolute 0.40 10*3/mm3      Eosinophils, Absolute 0.20 10*3/mm3      Basophils, Absolute 0.10 10*3/mm3      nRBC 0.1 /100 WBC          Microbiology Results (last 10 days)     ** No results found for the last 240 hours. **         CBC  Results from last 7 days   Lab Units 08/13/20 0318   WBC 10*3/mm3 5.70   RBC 10*6/mm3 5.12   HEMOGLOBIN g/dL 15.4   HEMATOCRIT % 47.5   MCV fL 92.7   PLATELETS 10*3/mm3 226       BMP  Results from last 7 days   Lab Units 08/13/20 0318   SODIUM mmol/L 141   POTASSIUM mmol/L 4.6   CHLORIDE mmol/L 99   CO2 mmol/L 36.0*   BUN  17   CREATININE mg/dL 0.78   GLUCOSE mg/dL 97       CMP Results from last 7 days   Lab Units 08/13/20 0318    SODIUM mmol/L 141   POTASSIUM mmol/L 4.6   CHLORIDE mmol/L 99   CO2 mmol/L 36.0*   BUN  17   CREATININE mg/dL 0.78   GLUCOSE mg/dL 97   ALBUMIN g/dL 3.50   BILIRUBIN mg/dL 0.4   ALK PHOS U/L 40   AST (SGOT) U/L 19   ALT (SGPT) U/L 21       TROPONIN  Results from last 7 days   Lab Units 08/13/20  0318   CK TOTAL U/L 45   TROPONIN T ng/mL <0.010       CoAg  Results from last 7 days   Lab Units 08/13/20  0318   INR  1.00   APTT seconds 29.3       Creatinine Clearance  Estimated Creatinine Clearance: 159.7 mL/min (by C-G formula based on SCr of 0.78 mg/dL).    ABG  Results from last 7 days   Lab Units 08/13/20  0815 08/13/20  0343   PH, ARTERIAL pH units 7.285* 7.308*   PCO2, ARTERIAL mm Hg 82.7* 81.1*   PO2 ART mm Hg 94.8 86.8   O2 SATURATION ART % 95.7 94.8   BASE EXCESS ART mmol/L 8.1* 9.6*     IMAGING & OTHER STUDIES    Imaging Results (Last 72 Hours)     Procedure Component Value Units Date/Time    CT Chest Pulmonary Embolism [[id number]] Collected:  08/13/20 0245     Updated:  08/13/20 0452    Narrative:       CT ANGIOGRAM OF THE CHEST    INDICATION: Cough, shortness of breath. Coronavirus positive.    TECHNIQUE: CT scan of the chest was performed following the administration of 100 mL Isovue-370 intravenous contrast. Imaging was performed to optimize evaluation of the pulmonary arterial system. CT dose lowering techniques were used, to include:   automated exposure control, adjustment for patient size, and / or use of iterative reconstruction. MIP images were also generated.    COMPARISON: None    FINDINGS:  Vasculature: There are no filling defects in the central, lobar, proximal segmental or distal segmental pulmonary arteries. There is no evidence of right heart strain or pulmonary infarction. Coronary artery calcifications are present. There is fusiform   aneurysmal dilatation of the ascending aorta measuring 4.7 cm in diameter. The pulmonary trunk is also enlarged measuring 3.5 cm in  diameter.    Mediastinum / Pleura: There is no pericardial or pleural effusion. Borderline right hilar lymph nodes are present.    Airways / Lungs: The central airways are patent.  There is no pneumothorax or consolidative parenchymal disease. Subsegmental atelectatic changes are present in both lung bases.    Bones: No destructive osseous lesion is identified.    Upper Abdomen: Limited images below the diaphragm demonstrate no acute abnormality. Nonobstructing stones are present in each kidney.      Impression:         1. No evidence of pulmonary embolism. No evidence of an acute intrathoracic process.  2. Borderline enlarged right hilar lymph nodes, nonspecific.  3. Fusiform ascending aortic aneurysm measuring 4.7 cm in diameter.  4. Enlarged pulmonary trunk. This can be seen in the setting of pulmonary arterial hypertension.  5. Coronary artery atherosclerosis.  6. Nonobstructing stones in each kidney.    Electronically signed by:  Davey Boo M.D.    8/13/2020 2:51 AM          ASSESSMENT      Acute respiratory failure with hypoxia and hypercapnia (CMS/HCC)    COVID-19    Ascending aortic aneurysm (CMS/HCC)    Essential hypertension    Lymphedema    Vitamin D deficiency    Polyneuropathy    Bipolar 1 disorder (CMS/HCC)    Shortness of breath    Obesity, morbid, BMI 50 or higher (CMS/HCC)    PLAN    I reviewed her CT scan of the chest films.  I also reviewed his ABG.  He likely has chronic hypercapnic respiratory failure likely from his obesity hypoventilation syndrome.  We will continue with noninvasive ventilation.  I will switch his BiPAP settings to 20/4.  We will repeat ABG and adjust settings as appropriate.  His CT scan of the chest is concerning for pulmonary hypertension.  We will proceed with a 2D echo once he is more stable and hopefully after his COVID-19 testing is negative.  I will repeat his COVID-19 testing.  Check BNP level.  Agree with diuresis.  We will wean FiO2 as appropriate.  He is on  Lovenox for DVT prophylaxis.  We will make further recommendations depending on his progress.  He may benefit from trilogy noninvasive ventilation at discharge.  I thank you for this opportunity take part in this patient's care and will follow the patient along with you    This document has been electronically signed by  Jayce Newby MD  11:36 AM

## 2020-08-13 NOTE — H&P
[facility] Medicine Services      Patient Name: Fox Ramirez  : 1955  MRN: [id number]  Primary Care Physician: Steve Valerio MD  Date of admission: 2020    Patient Care Team:  Steve Valerio MD as PCP - General (Hospitalist)          Subjective   History Present Illness     Chief Complaint:   Chief Complaint   Patient presents with   • Shortness of Breath       Mr. Ramirez is a 65 y.o. morbidly obese male who was sent to the [facility] ED on 2020 from [facility] complaining of shortness of breath. The patient is from [place] and recently tested positive for COVID-19. He was sent to [facility] as they have a unit dedicated to COVID positive patients. The patient was reportedly hypoxic overnight with O2 sat mid-80s on room air. He was placed on supplemental oxygen with improvement in his saturation. Upon arrival to the ER here the patient's O2 sat was 91% on 4 liters. His ABG revealed pH 7.308, CO2 81.1, pO2 86.8, HCO3 40.6. He was placed on BiPAP. His CBC and CMP were unremarkable. His proBNP and troponin were normal. His dimer was elevated at 1.19, but his CT chest was negative for pulmonary embolism. He was also noted have an ascending aortic aneurysm measuring 4.7 cm. His EKG showed sinus rhythm. He was admitted for further evaluation and treatment.     The patient is drowsy, but easy to arouse and oriented. He is having difficulty talking while on BiPAP. Nursing home records reviewed. The patient has a history of HTN, lymphedema, Bipolar disorder, polyneuropathy, and vitamin D deficiency. He is currently on metformin at the rehab facility, but denies a history of Type 2 DM or borderline DM. He states he was not previously on metformin. The patient is currently complaining of shortness of breath. He denies any other complaints.       Review of Systems   Respiratory: Positive for shortness of breath.    All other  systems reviewed and are negative.        Personal History     Past Medical History:   Past Medical History:   Diagnosis Date   • Bipolar 1 disorder (CMS/[facility])    • Hypertension    • Lymphedema    • Obesity, morbid, BMI 50 or higher (CMS/[facility]) 8/13/2020   • Polyneuropathy    • Vitamin D deficiency        Surgical History:    History reviewed. No pertinent surgical history.    Family History: Family history is unknown by patient. Otherwise pertinent FHx was reviewed and unremarkable.     Social History: Alcohol use questions deferred to the physician. Drug use questions deferred to the physician.      Medications:  Prior to Admission medications    Medication Sig Start Date End Date Taking? Authorizing Provider   amLODIPine (NORVASC) 10 MG tablet Take 10 mg by mouth Daily.   Yes Malathi Del Rio MD   celecoxib (CeleBREX) 100 MG capsule Take 50 mg by mouth 2 (Two) Times a Day.   Yes Malathi Del Rio MD   Cholecalciferol (VITAMIN D3) 125 MCG (5000 UT) capsule capsule Take 1,000 Units by mouth Daily.   Yes Malathi Del Rio MD   cyclobenzaprine (FLEXERIL) 5 MG tablet Take 5 mg by mouth 2 (Two) Times a Day As Needed for Muscle Spasms.   Yes Malathi Del Rio MD   divalproex (DEPAKOTE) 125 MG DR tablet Take 750 mg by mouth 3 (Three) Times a Day.   Yes Malathi Del Rio MD   furosemide (LASIX) 20 MG tablet Take 20 mg by mouth Daily.   Yes Malathi Del Rio MD   gabapentin (NEURONTIN) 600 MG tablet Take 600 mg by mouth 3 (Three) Times a Day.   Yes Malathi Del Rio MD   lisinopril (PRINIVIL,ZESTRIL) 40 MG tablet Take 40 mg by mouth Daily.   Yes Malathi Del Rio MD   metFORMIN ER (GLUCOPHAGE-XR) 500 MG 24 hr tablet Take 500 mg by mouth Daily With Breakfast.   Yes Malathi Del Rio MD   QUEtiapine (SEROquel) 100 MG tablet Take 200 mg by mouth 2 (Two) Times a Day.   Yes Malathi Del Rio MD   QUEtiapine XR (SEROquel XR) 200 MG 24 hr tablet Take 200 mg by mouth Every Night.   Yes  Provider, MD Malathi   zinc oxide 20 % ointment Apply  topically to the appropriate area as directed Daily. Apply to buttocks topically daily and prn   Yes Provider, MD Malathi       Allergies:    Allergies   Allergen Reactions   • Morphine Nausea And Vomiting       Objective   Objective     Vital Signs  Temp:  [97.1 °F (36.2 °C)-98.4 °F (36.9 °C)] 97.1 °F (36.2 °C)  Heart Rate:  [65-80] 65  Resp:  [16-20] 18  BP: (124-139)/(61-78) 125/78  SpO2:  [91 %-96 %] 95 %  on  Flow (L/min):  [4] 4;   Device (Oxygen Therapy): NPPV/NIV  Body mass index is 50.66 kg/m².    Physical Exam   Constitutional: He is oriented to person, place, and time. He appears well-developed.   Morbidly obese   HENT:   Head: Normocephalic and atraumatic.   Mouth/Throat: Oropharynx is clear and moist.   Eyes: Pupils are equal, round, and reactive to light. Conjunctivae and EOM are normal.   Neck: Normal range of motion. Neck supple.   Cardiovascular: Normal rate, regular rhythm and intact distal pulses.   Pulmonary/Chest: He has decreased breath sounds.   On BiPAP 60% FiO2   Abdominal: Soft. Bowel sounds are normal. He exhibits no distension. There is no tenderness.   Musculoskeletal: Normal range of motion. He exhibits edema.   BLE   Neurological: He is oriented to person, place, and time.   Drowsy, easy to arouse   Skin: Skin is warm and dry. Capillary refill takes 2 to 3 seconds.   Psychiatric: He has a normal mood and affect. His behavior is normal.   Vitals reviewed.        Results Review:  I have personally reviewed most recent cardiac tracings, lab results and radiology images and interpretations and agree with findings.    Results from last 7 days   Lab Units 08/13/20  0318   WBC 10*3/mm3 5.70   HEMOGLOBIN g/dL 15.4   HEMATOCRIT % 47.5   PLATELETS 10*3/mm3 226   INR  1.00     Results from last 7 days   Lab Units 08/13/20  0318   SODIUM mmol/L 141   POTASSIUM mmol/L 4.6   CHLORIDE mmol/L 99   CO2 mmol/L 36.0*   BUN  17   CREATININE  mg/dL 0.78   GLUCOSE mg/dL 97   CALCIUM mg/dL 8.3*   ALT (SGPT) U/L 21   AST (SGOT) U/L 19   TROPONIN T ng/mL <0.010   PROBNP pg/mL 40.4     Estimated Creatinine Clearance: 159.7 mL/min (by C-G formula based on SCr of 0.78 mg/dL).  Brief Urine Lab Results     None          Microbiology Results (last 10 days)     ** No results found for the last 240 hours. **          ECG/EMG Results (most recent)     Procedure Component Value Units Date/Time    ECG 12 Lead [[id number]] Collected:  08/13/20 0302     Updated:  08/13/20 0303    Narrative:       HEART RATE= 79  bpm  RR Interval= 760  ms  KY Interval= 192  ms  P Horizontal Axis= 10  deg  P Front Axis= 42  deg  QRSD Interval= 126  ms  QT Interval= 384  ms  QRS Axis= -55  deg  T Wave Axis= 101  deg  - ABNORMAL ECG -  Sinus rhythm  Nonspecific IVCD with LAD  Inferior infarct, old  Nonspecific T abnormalities, lateral leads  Electronically Signed By:   Date and Time of Study: 2020-08-13 03:02:12          Ct Chest Pulmonary Embolism    Result Date: 8/13/2020  1. No evidence of pulmonary embolism. No evidence of an acute intrathoracic process. 2. Borderline enlarged right hilar lymph nodes, nonspecific. 3. Fusiform ascending aortic aneurysm measuring 4.7 cm in diameter. 4. Enlarged pulmonary trunk. This can be seen in the setting of pulmonary arterial hypertension. 5. Coronary artery atherosclerosis. 6. Nonobstructing stones in each kidney. Electronically signed by:  Davey Boo M.D.  8/13/2020 2:51 AM      Estimated Creatinine Clearance: 159.7 mL/min (by C-G formula based on SCr of 0.78 mg/dL).      Assessment/Plan   Assessment/Plan       Active Hospital Problems    Diagnosis  POA   • **Acute respiratory failure with hypoxia and hypercapnia (CMS/HCC) [J96.01, J96.02]  Yes     Priority: High   • COVID-19 [U07.1]  Yes     Priority: High   • Shortness of breath [R06.02]  Yes     Priority: Medium   • Ascending aortic aneurysm (CMS/HCC) [I71.2]  Yes   • Essential hypertension  [I10]  Yes   • Lymphedema [I89.0]  Yes   • Vitamin D deficiency [E55.9]  Yes   • Polyneuropathy [G62.9]  Yes   • Bipolar 1 disorder (CMS/HCC) [F31.9]  Yes   • Obesity, morbid, BMI 50 or higher (CMS/HCC) [E66.01]  Yes      Resolved Hospital Problems   No resolved problems to display.       Acute respiratory failure with hypoxia and hypercapnia   -suspect acute lung injury secondary to COVID-19  -CT chest reviewed; no pneumonia or PE noted  -currently on BiPAP  -repeat ABG worse; KPA consulted  -titrate O2 to keep sat>92%  -bronchodilators  -given IV Decadron 6 mg in ER; continue daily x 9 more days    COVID-19  -patient diagnosed a few weeks ago in [place]  -O2 and IV steroids as above  -patient does not qualify for Remdesivir as he was positive a few weeks ago  -monitor COVID-19 progression labs    Shortness of breath  -as above    Ascending aortic aneurysm  -incidental finding on CT scan, measures 4.7 cm  -needs echo and referral to cardiothoracic surgery once COVID negative    Essential hypertension, chronic, controlled  -hold amlodipine and lisinopril while NPO  -PRN IV hydralazine  -monitor BP    Lymphedema  -hold PO Lasix while NPO  -Lasix 40 mg IV daily    Vitamin D deficiency  -hold supplement while NPO    Polyneuropathy  -hold Celebrex, Flexeril and gabapentin while NPO    Bipolar 1 disorder   -hold Depakote and Seroquel while NPO  -monitor     Obesity, morbid, BMI 50 or higher  -BMI 50.66  -encourage lifestyle modifications  -patient on metformin at rehab facility, but denies h/o Type 2 DM or borderline DM-----check A1c, accu checks q6h with SSI, hold metformin while inpatient         VTE Prophylaxis -   Mechanical Order History:     None      Pharmalogical Order History:     Ordered     Dose Route Frequency Stop    08/13/20 6271  enoxaparin (LOVENOX) syringe 90 mg      0.5 mg/kg SC Every 12 Hours --          CODE STATUS:    Code Status and Medical Interventions:   Ordered at: 08/13/20 4589     Code  Status:    CPR     Medical Interventions (Level of Support Prior to Arrest):    Full       This patient has been examined wearing appropriate Personal Protective Equipment. 08/13/20      I discussed the patient's findings and my recommendations with patient, nursing staff and consulting provider.        Electronically signed by SUBHASH Blount, 08/13/20, 9:05 AM.  [facility]

## 2020-08-13 NOTE — ED PROVIDER NOTES
Subjective   Pleasant 65-year-old male complains of shortness of air in the setting of known COVID infection.  Patient was apparently diagnosed several weeks ago in [place] and transferred to a local nursing home and has a COVID unit.  Patient's oxygen saturation was noted to be in the mid 80s without any supplemental oxygen.  Patient was placed on 3 L improvement of oxygen saturations in the low 90s.  Patient denies any worsening of his symptoms.  Patient does have a mild cough, shortness of air, no chest pain or fever.  Patient does have diarrhea.          Review of Systems   Respiratory: Positive for cough and shortness of breath.    Gastrointestinal: Positive for diarrhea.   All other systems reviewed and are negative.      No past medical history on file.    Allergies   Allergen Reactions   • Morphine Nausea And Vomiting       No past surgical history on file.    No family history on file.    Social History     Socioeconomic History   • Marital status:      Spouse name: Not on file   • Number of children: Not on file   • Years of education: Not on file   • Highest education level: Not on file           Objective   Physical Exam   Constitutional: He is oriented to person, place, and time.   Pleasant morbidly obese male in no acute distress   HENT:   Head: Normocephalic and atraumatic.   Mouth/Throat: Oropharynx is clear and moist.   Eyes: EOM are normal.   Right pupil 5, left pupil 3, reactive bilaterally.  Patient states this is a chronic problem and denies any eye pain or change in vision or headache.   Neck: Normal range of motion. Neck supple.   Cardiovascular: Normal rate, regular rhythm and normal heart sounds.   Pulmonary/Chest: Effort normal and breath sounds normal.   Abdominal: Soft. Bowel sounds are normal. He exhibits no distension. There is no tenderness.   Musculoskeletal: Normal range of motion. He exhibits no edema or tenderness.   Neurological: He is alert and oriented to person,  place, and time.   Skin: Skin is warm and dry. Capillary refill takes less than 2 seconds.   Psychiatric: He has a normal mood and affect. His behavior is normal.       Procedures           ED Course  ED Course as of Aug 13 0515   Thu Aug 13, 2020   0311 EKG interpretation: Normal sinus rhythm, nonspecific IVCD, no acute ST change    [JR]      ED Course User Index  [JR] Davi Hinojosa MD                                           MDM  Number of Diagnoses or Management Options  Acute respiratory failure with hypoxia and hypercapnia (CMS/HCC):   Ascending aortic aneurysm (CMS/HCC):   COVID-19:   Diagnosis management comments: Results for orders placed or performed during the hospital encounter of 08/13/20  -Comprehensive Metabolic Panel       Result                      Value             Ref Range           Glucose                     97                65 - 99 mg/dL       BUN                                                               Creatinine                  0.78              0.76 - 1.27 *       Sodium                      141               136 - 145 mm*       Potassium                   4.6               3.5 - 5.2 mm*       Chloride                    99                98 - 107 mmo*       CO2                         36.0 (H)          22.0 - 29.0 *       Calcium                     8.3 (L)           8.6 - 10.5 m*       Total Protein               6.5               6.0 - 8.5 g/*       Albumin                     3.50              3.50 - 5.20 *       ALT (SGPT)                  21                1 - 41 U/L          AST (SGOT)                  19                1 - 40 U/L          Alkaline Phosphatase        40                39 - 117 U/L        Total Bilirubin             0.4               0.0 - 1.2 mg*       eGFR Non African Amer       100               >60 mL/min/1*       Globulin                    3.0               gm/dL               A/G Ratio                   1.2               g/dL                 BUN/Creatinine Ratio                                              Anion Gap                   6.0               5.0 - 15.0 m*  -Protime-INR       Result                      Value             Ref Range           Protime                     10.9              9.6 - 11.7 S*       INR                         1.00              0.93 - 1.10    -aPTT       Result                      Value             Ref Range           PTT                         29.3              24.0 - 31.0 *  -BNP       Result                      Value             Ref Range           proBNP                      40.4              0.0 - 900.0 *  -D-dimer, Quantitative       Result                      Value             Ref Range           D-Dimer, Quantitative       1.19 (H)          0.00 - 0.59 *  -Troponin       Result                      Value             Ref Range           Troponin T                  <0.010            0.000 - 0.03*  -CBC Auto Differential       Result                      Value             Ref Range           WBC                         5.70              3.40 - 10.80*       RBC                         5.12              4.14 - 5.80 *       Hemoglobin                  15.4              13.0 - 17.7 *       Hematocrit                  47.5              37.5 - 51.0 %       MCV                         92.7              79.0 - 97.0 *       MCH                         30.1              26.6 - 33.0 *       MCHC                        32.5              31.5 - 35.7 *       RDW                         13.9              12.3 - 15.4 %       RDW-SD                      45.5              37.0 - 54.0 *       MPV                         8.2               6.0 - 12.0 fL       Platelets                   226               140 - 450 10*       Neutrophil %                60.9              42.7 - 76.0 %       Lymphocyte %                27.1              19.6 - 45.3 %       Monocyte %                  7.2               5.0 - 12.0 %        Eosinophil %                 3.3               0.3 - 6.2 %         Basophil %                  1.5               0.0 - 1.5 %         Neutrophils, Absolute       3.40              1.70 - 7.00 *       Lymphocytes, Absolute       1.50              0.70 - 3.10 *       Monocytes, Absolute         0.40              0.10 - 0.90 *       Eosinophils, Absolute       0.20              0.00 - 0.40 *       Basophils, Absolute         0.10              0.00 - 0.20 *       nRBC                        0.1               0.0 - 0.2 /1*  -Blood Gas, Arterial       Result                      Value             Ref Range           Site                        Right Radial                          Janes's Test                Positive                              pH, Arterial                7.308 (L)         7.350 - 7.45*       pCO2, Arterial              81.1 (C)          35.0 - 48.0 *       pO2, Arterial               86.8              83.0 - 108.0*       HCO3, Arterial              40.6 (H)          21.0 - 28.0 *       Base Excess, Arterial       9.6 (H)           0.0 - 3.0 mm*       O2 Saturation, Arterial     94.8              94.0 - 98.0 %       CO2 Content                 43.1 (H)          22 - 29 mmol*       Barometric Pressure fo*                                           Modality                    Cannula                               FIO2                        36                %                   Hemodilution                No                               -BUN       Result                      Value             Ref Range           BUN                         17                8 - 23 mg/dL   Ct Chest Pulmonary Embolism    Result Date: 8/13/2020  1. No evidence of pulmonary embolism. No evidence of an acute intrathoracic process. 2. Borderline enlarged right hilar lymph nodes, nonspecific. 3. Fusiform ascending aortic aneurysm measuring 4.7 cm in diameter. 4. Enlarged pulmonary trunk. This can be seen in the setting of pulmonary arterial  hypertension. 5. Coronary artery atherosclerosis. 6. Nonobstructing stones in each kidney. Electronically signed by:  Davey Boo M.D.  8/13/2020 2:51 AM    Patient appears well despite hyper Knee and hypoxemia except for some mild drowsiness.  No wheezing appreciated, will admit, BiPAP treatment.       Amount and/or Complexity of Data Reviewed  Clinical lab tests: reviewed  Tests in the radiology section of CPT®: reviewed        Final diagnoses:   COVID-19   Acute respiratory failure with hypoxia and hypercapnia (CMS/HCC)   Ascending aortic aneurysm (CMS/HCC)            Davi Hinojosa MD  08/13/20 0515

## 2020-08-13 NOTE — SIGNIFICANT NOTE
Transported pt to new room.  Found settings on 16/6 ST bipap.  Changed pts mask to full face mask as the pt was not achieving adequate tidal volumes.      Notified Dr Hinojosa and admitting Dr of bipap settings not as ordered    Thanks   Radha

## 2020-08-13 NOTE — PROGRESS NOTES
Discharge Planning Assessment   [facility]     Patient Name: Fox Ramirez  MRN: [id number]  Today's Date: 8/13/2020    Admit Date: 8/13/2020           Discharge Plan     Row Name 08/13/20 1602       Plan    Plan  VM left for brother re: dc planning. CM will follow up 8/14 tomorrow.                         Natali Dia RN

## 2020-08-14 LAB
ALBUMIN SERPL-MCNC: 3.5 G/DL (ref 3.5–5.2)
ALBUMIN/GLOB SERPL: 1.2 G/DL
ALP SERPL-CCNC: 42 U/L (ref 39–117)
ALT SERPL W P-5'-P-CCNC: 26 U/L (ref 1–41)
ANION GAP SERPL CALCULATED.3IONS-SCNC: 7 MMOL/L (ref 5–15)
ARTERIAL PATENCY WRIST A: POSITIVE
AST SERPL-CCNC: 26 U/L (ref 1–40)
ATMOSPHERIC PRESS: ABNORMAL MM[HG]
BASE EXCESS BLDA CALC-SCNC: 10.8 MMOL/L (ref 0–3)
BASOPHILS # BLD AUTO: 0 10*3/MM3 (ref 0–0.2)
BASOPHILS NFR BLD AUTO: 0.3 % (ref 0–1.5)
BDY SITE: ABNORMAL
BILIRUB SERPL-MCNC: 0.5 MG/DL (ref 0–1.2)
BUN SERPL-MCNC: 22 MG/DL (ref 8–23)
BUN SERPL-MCNC: ABNORMAL MG/DL
BUN/CREAT SERPL: ABNORMAL
CALCIUM SPEC-SCNC: 8.8 MG/DL (ref 8.6–10.5)
CHLORIDE SERPL-SCNC: 97 MMOL/L (ref 98–107)
CK SERPL-CCNC: 34 U/L (ref 20–200)
CO2 BLDA-SCNC: 42.8 MMOL/L (ref 22–29)
CO2 SERPL-SCNC: 34 MMOL/L (ref 22–29)
CREAT SERPL-MCNC: 0.71 MG/DL (ref 0.76–1.27)
CRP SERPL-MCNC: 0.36 MG/DL (ref 0–0.5)
D DIMER PPP FEU-MCNC: 0.7 MG/L (FEU) (ref 0–0.59)
DEPRECATED RDW RBC AUTO: 44.6 FL (ref 37–54)
EOSINOPHIL # BLD AUTO: 0.1 10*3/MM3 (ref 0–0.4)
EOSINOPHIL NFR BLD AUTO: 0.9 % (ref 0.3–6.2)
ERYTHROCYTE [DISTWIDTH] IN BLOOD BY AUTOMATED COUNT: 13.8 % (ref 12.3–15.4)
FERRITIN SERPL-MCNC: 84.65 NG/ML (ref 30–400)
FIBRINOGEN PPP-MCNC: 272 MG/DL (ref 210–450)
GFR SERPL CREATININE-BSD FRML MDRD: 112 ML/MIN/1.73
GLOBULIN UR ELPH-MCNC: 3 GM/DL
GLUCOSE BLDC GLUCOMTR-MCNC: 119 MG/DL (ref 70–105)
GLUCOSE BLDC GLUCOMTR-MCNC: 148 MG/DL (ref 70–105)
GLUCOSE BLDC GLUCOMTR-MCNC: 168 MG/DL (ref 70–105)
GLUCOSE SERPL-MCNC: 106 MG/DL (ref 65–99)
HCO3 BLDA-SCNC: 40.6 MMOL/L (ref 21–28)
HCT VFR BLD AUTO: 47.3 % (ref 37.5–51)
HEMODILUTION: NO
HGB BLD-MCNC: 15.4 G/DL (ref 13–17.7)
INHALED O2 CONCENTRATION: 60 %
LDH SERPL-CCNC: 244 U/L (ref 135–225)
LYMPHOCYTES # BLD AUTO: 1.3 10*3/MM3 (ref 0.7–3.1)
LYMPHOCYTES NFR BLD AUTO: 15.6 % (ref 19.6–45.3)
MAGNESIUM SERPL-MCNC: 2 MG/DL (ref 1.6–2.4)
MCH RBC QN AUTO: 29.9 PG (ref 26.6–33)
MCHC RBC AUTO-ENTMCNC: 32.6 G/DL (ref 31.5–35.7)
MCV RBC AUTO: 91.7 FL (ref 79–97)
MODALITY: ABNORMAL
MONOCYTES # BLD AUTO: 0.4 10*3/MM3 (ref 0.1–0.9)
MONOCYTES NFR BLD AUTO: 4.4 % (ref 5–12)
NEUTROPHILS NFR BLD AUTO: 6.5 10*3/MM3 (ref 1.7–7)
NEUTROPHILS NFR BLD AUTO: 78.8 % (ref 42.7–76)
NRBC BLD AUTO-RTO: 0.3 /100 WBC (ref 0–0.2)
PCO2 BLDA: 70.5 MM HG (ref 35–48)
PH BLDA: 7.37 PH UNITS (ref 7.35–7.45)
PLATELET # BLD AUTO: 115 10*3/MM3 (ref 140–450)
PMV BLD AUTO: 8.8 FL (ref 6–12)
PO2 BLDA: 87.2 MM HG (ref 83–108)
POTASSIUM SERPL-SCNC: 4.4 MMOL/L (ref 3.5–5.2)
PROT SERPL-MCNC: 6.5 G/DL (ref 6–8.5)
RBC # BLD AUTO: 5.15 10*6/MM3 (ref 4.14–5.8)
RBC MORPH BLD: NORMAL
SAO2 % BLDCOA: 95.8 % (ref 94–98)
SMALL PLATELETS BLD QL SMEAR: ADEQUATE
SODIUM SERPL-SCNC: 138 MMOL/L (ref 136–145)
WBC # BLD AUTO: 8.3 10*3/MM3 (ref 3.4–10.8)
WBC MORPH BLD: NORMAL

## 2020-08-14 PROCEDURE — 82962 GLUCOSE BLOOD TEST: CPT

## 2020-08-14 PROCEDURE — 94799 UNLISTED PULMONARY SVC/PX: CPT

## 2020-08-14 PROCEDURE — 85384 FIBRINOGEN ACTIVITY: CPT | Performed by: NURSE PRACTITIONER

## 2020-08-14 PROCEDURE — 94660 CPAP INITIATION&MGMT: CPT

## 2020-08-14 PROCEDURE — 83615 LACTATE (LD) (LDH) ENZYME: CPT | Performed by: NURSE PRACTITIONER

## 2020-08-14 PROCEDURE — 82550 ASSAY OF CK (CPK): CPT | Performed by: NURSE PRACTITIONER

## 2020-08-14 PROCEDURE — 82803 BLOOD GASES ANY COMBINATION: CPT

## 2020-08-14 PROCEDURE — 85379 FIBRIN DEGRADATION QUANT: CPT | Performed by: NURSE PRACTITIONER

## 2020-08-14 PROCEDURE — 85007 BL SMEAR W/DIFF WBC COUNT: CPT | Performed by: NURSE PRACTITIONER

## 2020-08-14 PROCEDURE — 63710000001 INSULIN LISPRO (HUMAN) PER 5 UNITS: Performed by: NURSE PRACTITIONER

## 2020-08-14 PROCEDURE — 25010000002 ENOXAPARIN PER 10 MG: Performed by: INTERNAL MEDICINE

## 2020-08-14 PROCEDURE — 82728 ASSAY OF FERRITIN: CPT | Performed by: NURSE PRACTITIONER

## 2020-08-14 PROCEDURE — 85025 COMPLETE CBC W/AUTO DIFF WBC: CPT | Performed by: NURSE PRACTITIONER

## 2020-08-14 PROCEDURE — 25010000002 DEXAMETHASONE SODIUM PHOSPHATE 10 MG/ML SOLUTION: Performed by: NURSE PRACTITIONER

## 2020-08-14 PROCEDURE — 94640 AIRWAY INHALATION TREATMENT: CPT

## 2020-08-14 PROCEDURE — 25010000002 FUROSEMIDE PER 20 MG: Performed by: NURSE PRACTITIONER

## 2020-08-14 PROCEDURE — 99232 SBSQ HOSP IP/OBS MODERATE 35: CPT | Performed by: INTERNAL MEDICINE

## 2020-08-14 PROCEDURE — 25010000002 ENOXAPARIN PER 10 MG: Performed by: NURSE PRACTITIONER

## 2020-08-14 PROCEDURE — 80053 COMPREHEN METABOLIC PANEL: CPT | Performed by: NURSE PRACTITIONER

## 2020-08-14 PROCEDURE — 83735 ASSAY OF MAGNESIUM: CPT | Performed by: NURSE PRACTITIONER

## 2020-08-14 PROCEDURE — 86140 C-REACTIVE PROTEIN: CPT | Performed by: NURSE PRACTITIONER

## 2020-08-14 PROCEDURE — 36600 WITHDRAWAL OF ARTERIAL BLOOD: CPT

## 2020-08-14 RX ORDER — QUETIAPINE FUMARATE 50 MG/1
200 TABLET, EXTENDED RELEASE ORAL NIGHTLY
Status: DISCONTINUED | OUTPATIENT
Start: 2020-08-14 | End: 2020-08-20 | Stop reason: HOSPADM

## 2020-08-14 RX ORDER — FUROSEMIDE 20 MG/1
20 TABLET ORAL DAILY
Status: DISCONTINUED | OUTPATIENT
Start: 2020-08-15 | End: 2020-08-20 | Stop reason: HOSPADM

## 2020-08-14 RX ORDER — GABAPENTIN 300 MG/1
600 CAPSULE ORAL EVERY 8 HOURS SCHEDULED
Status: DISCONTINUED | OUTPATIENT
Start: 2020-08-14 | End: 2020-08-20 | Stop reason: HOSPADM

## 2020-08-14 RX ORDER — QUETIAPINE FUMARATE 100 MG/1
200 TABLET, FILM COATED ORAL 2 TIMES DAILY
Status: DISCONTINUED | OUTPATIENT
Start: 2020-08-14 | End: 2020-08-20 | Stop reason: HOSPADM

## 2020-08-14 RX ORDER — CYCLOBENZAPRINE HCL 10 MG
5 TABLET ORAL 2 TIMES DAILY PRN
Status: DISCONTINUED | OUTPATIENT
Start: 2020-08-14 | End: 2020-08-20 | Stop reason: HOSPADM

## 2020-08-14 RX ORDER — CELECOXIB 100 MG/1
100 CAPSULE ORAL 2 TIMES DAILY
Status: DISCONTINUED | OUTPATIENT
Start: 2020-08-14 | End: 2020-08-20 | Stop reason: HOSPADM

## 2020-08-14 RX ORDER — ZINC OXIDE 20 %
OINTMENT (GRAM) TOPICAL EVERY 12 HOURS SCHEDULED
Status: DISCONTINUED | OUTPATIENT
Start: 2020-08-14 | End: 2020-08-20 | Stop reason: HOSPADM

## 2020-08-14 RX ORDER — AMLODIPINE BESYLATE 5 MG/1
10 TABLET ORAL DAILY
Status: DISCONTINUED | OUTPATIENT
Start: 2020-08-14 | End: 2020-08-20 | Stop reason: HOSPADM

## 2020-08-14 RX ADMIN — Medication 10 ML: at 07:40

## 2020-08-14 RX ADMIN — DIVALPROEX SODIUM 750 MG: 250 TABLET, DELAYED RELEASE ORAL at 21:11

## 2020-08-14 RX ADMIN — ALBUTEROL SULFATE 2 PUFF: 90 AEROSOL, METERED RESPIRATORY (INHALATION) at 19:46

## 2020-08-14 RX ADMIN — GABAPENTIN 600 MG: 300 CAPSULE ORAL at 21:12

## 2020-08-14 RX ADMIN — QUETIAPINE 200 MG: 100 TABLET, FILM COATED ORAL at 21:11

## 2020-08-14 RX ADMIN — ENOXAPARIN SODIUM 40 MG: 40 INJECTION SUBCUTANEOUS at 21:10

## 2020-08-14 RX ADMIN — FUROSEMIDE 40 MG: 10 INJECTION, SOLUTION INTRAMUSCULAR; INTRAVENOUS at 07:39

## 2020-08-14 RX ADMIN — DEXAMETHASONE SODIUM PHOSPHATE 6 MG: 10 INJECTION, SOLUTION INTRAMUSCULAR; INTRAVENOUS at 07:40

## 2020-08-14 RX ADMIN — QUETIAPINE FUMARATE 200 MG: 50 TABLET, EXTENDED RELEASE ORAL at 21:11

## 2020-08-14 RX ADMIN — ALBUTEROL SULFATE 2 PUFF: 90 AEROSOL, METERED RESPIRATORY (INHALATION) at 11:39

## 2020-08-14 RX ADMIN — Medication 10 ML: at 21:12

## 2020-08-14 RX ADMIN — ALBUTEROL SULFATE 2 PUFF: 90 AEROSOL, METERED RESPIRATORY (INHALATION) at 09:48

## 2020-08-14 RX ADMIN — ZINC OXIDE: 200 OINTMENT TOPICAL at 07:42

## 2020-08-14 RX ADMIN — INSULIN LISPRO 2 UNITS: 100 INJECTION, SOLUTION INTRAVENOUS; SUBCUTANEOUS at 18:13

## 2020-08-14 RX ADMIN — AMLODIPINE BESYLATE 10 MG: 5 TABLET ORAL at 21:12

## 2020-08-14 RX ADMIN — CELECOXIB 100 MG: 100 CAPSULE ORAL at 21:11

## 2020-08-14 RX ADMIN — ENOXAPARIN SODIUM 90 MG: 100 INJECTION SUBCUTANEOUS at 07:40

## 2020-08-14 RX ADMIN — ZINC OXIDE: 200 OINTMENT TOPICAL at 21:11

## 2020-08-14 RX ADMIN — BUDESONIDE AND FORMOTEROL FUMARATE DIHYDRATE 2 PUFF: 160; 4.5 AEROSOL RESPIRATORY (INHALATION) at 19:45

## 2020-08-14 RX ADMIN — BUDESONIDE AND FORMOTEROL FUMARATE DIHYDRATE 2 PUFF: 160; 4.5 AEROSOL RESPIRATORY (INHALATION) at 09:48

## 2020-08-14 NOTE — PROGRESS NOTES
[facility] Medicine Services Daily Progress Note      Hospitalist Team  LOS 1 days      Patient Care Team:  Steve Valerio MD as PCP - General (Hospitalist)    Patient Location: 210/1      Subjective   Subjective     Chief Complaint / Subjective  Chief Complaint   Patient presents with   • Shortness of Breath         Brief Synopsis of Hospital Course/HPI  Mr. Ramirez is a 65 y.o. morbidly obese male who was sent to the [facility] ED on 08/13/2020 [facility] complaining of shortness of breath. The patient is from [place] and recently tested positive for COVID-19. He was sent to [facility] as they have a unit dedicated to COVID positive patients. The patient was reportedly hypoxic overnight with O2 sat mid-80s on room air. He was placed on supplemental oxygen with improvement in his saturation. Upon arrival to the ER here the patient's O2 sat was 91% on 4 liters. His ABG revealed pH 7.308, CO2 81.1, pO2 86.8, HCO3 40.6. He was placed on BiPAP. His CBC and CMP were unremarkable. His proBNP and troponin were normal. His dimer was elevated at 1.19, but his CT chest was negative for pulmonary embolism. He was also noted have an ascending aortic aneurysm measuring 4.7 cm. His EKG showed sinus rhythm. He was admitted for further evaluation and treatment.      The patient is drowsy, but easy to arouse and oriented. He is having difficulty talking while on BiPAP. Nursing home records reviewed. The patient has a history of HTN, lymphedema, Bipolar disorder, polyneuropathy, and vitamin D deficiency. He is currently on metformin at the rehab facility, but denies a history of Type 2 DM or borderline DM. He states he was not previously on metformin. The patient is currently complaining of shortness of breath. He denies any other complaints.         8/14- Patient reports his breathing has improved.         Review of Systems   Cardiovascular: Negative  "for chest pain and palpitations.   Respiratory: Positive for cough. Negative for shortness of breath.          Objective   Objective      Vital Signs  Temp:  [96 °F (35.6 °C)-97.5 °F (36.4 °C)] 97.5 °F (36.4 °C)  Heart Rate:  [67-82] 75  Resp:  [14-23] 17  BP: (134-147)/(70-95) 147/79  Oxygen Therapy  SpO2: 95 %  Pulse Oximetry Type: Intermittent  Device (Oxygen Therapy): nasal cannula  Device (Oxygen Therapy): high-flow nasal cannula  Flow (L/min): 15  Oxygen Concentration (%): 60  Flowsheet Rows      First Filed Value   Admission Height  185.4 cm (73\") Documented at 08/13/2020 0307   Admission Weight  (!) 174 kg (384 lb) [[facility] ] Documented at 08/13/2020 0307        Intake & Output (last 3 days)       08/12 0701 - 08/13 0700 08/13 0701 - 08/14 0700 08/14 0701 - 08/15 0700    P.O.   720    Total Intake(mL/kg)   720 (4.1)    Urine (mL/kg/hr)  400 (0.1) 1300 (0.6)    Total Output  400 1300    Net  -400 -580           Urine Unmeasured Occurrence  1 x 2 x        Lines, Drains & Airways    Active LDAs     Name:   Placement date:   Placement time:   Site:   Days:    Peripheral IV 08/13/20 0318 Left Antecubital   08/13/20 0318    Antecubital   1                  Physical Exam:    Physical Exam   Constitutional: He appears well-developed. No distress.   Morbidly obese male lying in bed in NAD    HENT:   Head: Normocephalic and atraumatic.   Mouth/Throat: Oropharynx is clear and moist.   Pulmonary/Chest: Effort normal. No respiratory distress.   Neurological: He is alert.   Skin: Skin is warm and dry. No rash noted.   Psychiatric: He has a normal mood and affect. His behavior is normal.   Vitals reviewed.           Wounds (last 24 hours)      LDA Wound     Row Name               Wound 08/13/20 0845 coccyx     Wound - Properties Group Date first assessed: 08/13/20  -AF Time first assessed: 0845  -AF Location: coccyx  -AF Primary Wound Type: --  -AF, small open spot, unable to take " photo, zinc ordered, wocn consulted       User Key  (r) = Recorded By, (t) = Taken By, (c) = Cosigned By    Initials Name Provider Type    Yovana Young RN Registered Nurse          Procedures:              Results Review:     I reviewed the patient's new clinical results.      Lab Results (last 24 hours)     Procedure Component Value Units Date/Time    POC Glucose Once [[id number]]  (Abnormal) Collected:  08/14/20 1740    Specimen:  Blood Updated:  08/14/20 1750     Glucose 168 mg/dL      Comment: Serial Number: [id number]:  [id number]       BUN [[id number]]  (Normal) Collected:  08/14/20 0716    Specimen:  Blood Updated:  08/14/20 1634     BUN 22 mg/dL     Blood Gas, Arterial [[id number]]  (Abnormal) Collected:  08/14/20 1140    Specimen:  Arterial Blood Updated:  08/14/20 1222     Site Left Radial     Janes's Test Positive     pH, Arterial 7.369 pH units      pCO2, Arterial 70.5 mm Hg      pO2, Arterial 87.2 mm Hg      HCO3, Arterial 40.6 mmol/L      Base Excess, Arterial 10.8 mmol/L      Comment: Serial Number: [id number]:  [id number]        O2 Saturation, Arterial 95.8 %      CO2 Content 42.8 mmol/L      Barometric Pressure for Blood Gas --     Comment: N/A        Modality HFNC     FIO2 60 %      Hemodilution No    POC Glucose Once [[id number]]  (Abnormal) Collected:  08/14/20 1147    Specimen:  Blood Updated:  08/14/20 1159     Glucose 148 mg/dL      Comment: Serial Number: [id number]:  [id number]       Lactate Dehydrogenase [[id number]]  (Abnormal) Collected:  08/14/20 0716    Specimen:  Blood Updated:  08/14/20 0950      U/L      Comment: Specimen hemolyzed.  Results may be affected.       C-reactive Protein [[id number]]  (Normal) Collected:  08/14/20 0716    Specimen:  Blood Updated:  08/14/20 0949     C-Reactive Protein 0.36 mg/dL     Comprehensive Metabolic Panel [[id number]]  (Abnormal) Collected:  08/14/20 0716    Specimen:  Blood Updated:  08/14/20 0817     Glucose 106 mg/dL      BUN  --     Comment: Testing performed by alternate method        Creatinine 0.71 mg/dL      Sodium 138 mmol/L      Potassium 4.4 mmol/L      Comment: Specimen hemolyzed.  Results may be affected.        Chloride 97 mmol/L      CO2 34.0 mmol/L      Calcium 8.8 mg/dL      Total Protein 6.5 g/dL      Albumin 3.50 g/dL      ALT (SGPT) 26 U/L      AST (SGOT) 26 U/L      Comment: Specimen hemolyzed.  Results may be affected.        Alkaline Phosphatase 42 U/L      Total Bilirubin 0.5 mg/dL      eGFR Non African Amer 112 mL/min/1.73      Globulin 3.0 gm/dL      A/G Ratio 1.2 g/dL      BUN/Creatinine Ratio --     Comment: Testing not performed        Anion Gap 7.0 mmol/L     Narrative:       GFR Normal >60  Chronic Kidney Disease <60  Kidney Failure <15      Ferritin [[id number]]  (Normal) Collected:  08/14/20 0716    Specimen:  Blood Updated:  08/14/20 0817     Ferritin 84.65 ng/mL     Narrative:       Results may be falsely decreased if patient taking Biotin.      CBC & Differential [[id number]] Collected:  08/14/20 0716    Specimen:  Blood Updated:  08/14/20 0813    Narrative:       The following orders were created for panel order CBC & Differential.  Procedure                               Abnormality         Status                     ---------                               -----------         ------                     CBC Auto Differential[[id number]]        Abnormal            Final result                 Please view results for these tests on the individual orders.    CBC Auto Differential [[id number]]  (Abnormal) Collected:  08/14/20 0716    Specimen:  Blood Updated:  08/14/20 0813     WBC 8.30 10*3/mm3      RBC 5.15 10*6/mm3      Hemoglobin 15.4 g/dL      Hematocrit 47.3 %      MCV 91.7 fL      MCH 29.9 pg      MCHC 32.6 g/dL      RDW 13.8 %      RDW-SD 44.6 fl      MPV 8.8 fL      Platelets 115 10*3/mm3      Neutrophil % 78.8 %      Lymphocyte % 15.6 %      Monocyte % 4.4 %      Eosinophil % 0.9 %      Basophil % 0.3 %       Neutrophils, Absolute 6.50 10*3/mm3      Lymphocytes, Absolute 1.30 10*3/mm3      Monocytes, Absolute 0.40 10*3/mm3      Eosinophils, Absolute 0.10 10*3/mm3      Basophils, Absolute 0.00 10*3/mm3      nRBC 0.3 /100 WBC     Scan Slide [[id number]] Collected:  08/14/20 0716    Specimen:  Blood Updated:  08/14/20 0813     RBC Morphology Normal     WBC Morphology Normal     Platelet Estimate Adequate    Narrative:       Slide Reviewed      CK [[id number]]  (Normal) Collected:  08/14/20 0716    Specimen:  Blood Updated:  08/14/20 0812     Creatine Kinase 34 U/L     Magnesium [[id number]]  (Normal) Collected:  08/14/20 0716    Specimen:  Blood Updated:  08/14/20 0812     Magnesium 2.0 mg/dL     D-dimer, Quantitative [[id number]]  (Abnormal) Collected:  08/14/20 0716    Specimen:  Blood Updated:  08/14/20 0801     D-Dimer, Quantitative 0.70 mg/L (FEU)     Narrative:       Reference Range  --------------------------------------------------------------------     < 0.50   Negative Predictive Value  0.50-0.59   Indeterminate    >= 0.60   Probable VTE             A very low percentage of patients with DVT may yield D-Dimer results   below the cut-off of 0.50 mg/L FEU.  This is known to be more   prevalent in patients with distal DVT.             Results of this test should always be interpreted in conjunction with   the patient's medical history, clinical presentation and other   findings.  Clinical diagnosis should not be based on the result of   INNOVANCE D-Dimer alone.    Fibrinogen [[id number]]  (Normal) Collected:  08/14/20 0716    Specimen:  Blood Updated:  08/14/20 0801     Fibrinogen 272 mg/dL     POC Glucose Once [[id number]]  (Abnormal) Collected:  08/14/20 0618    Specimen:  Blood Updated:  08/14/20 0621     Glucose 119 mg/dL      Comment: Serial Number: [id number]:  [id number]           Hemoglobin A1C   Date Value Ref Range Status   08/13/2020 5.6 3.5 - 5.6 % Final     Results from last 7 days   Lab Units  08/13/20  0318   INR  1.00       Results from last 7 days   Lab Units 08/14/20  1140   PH, ARTERIAL pH units 7.369   PO2 ART mm Hg 87.2   PCO2, ARTERIAL mm Hg 70.5*   HCO3 ART mmol/L 40.6*     No results found for: LIPASE  No results found for: CHOL, CHLPL, TRIG, HDL, LDL, LDLDIRECT    No results found for: INTRAOP, PREDX, FINALDX, COMDX    Microbiology Results (last 10 days)     Procedure Component Value - Date/Time    S. Pneumo Ag Urine or CSF - Urine, Urine, Clean Catch [[id number]]  (Normal) Collected:  08/13/20 1810    Lab Status:  Final result Specimen:  Urine, Clean Catch Updated:  08/13/20 1856     Strep Pneumo Ag Negative    Legionella Antigen, Urine - Urine, Urine, Clean Catch [[id number]]  (Normal) Collected:  08/13/20 1810    Lab Status:  Final result Specimen:  Urine, Clean Catch Updated:  08/13/20 1855     LEGIONELLA ANTIGEN, URINE Negative    Respiratory Panel PCR w/COVID-19(SARS-CoV-2) IMMANUEL/JESSICA/JOSEMANUEL/PAD In-House, NP Swab in UTM/VTM, 3-4 HR TAT - Swab, Nasopharynx [[id number]]  (Abnormal) Collected:  08/13/20 1253    Lab Status:  Final result Specimen:  Swab from Nasopharynx Updated:  08/13/20 1416     ADENOVIRUS, PCR Not Detected     Coronavirus 229E Not Detected     Coronavirus HKU1 Not Detected     Coronavirus NL63 Not Detected     Coronavirus OC43 Not Detected     COVID19 Detected     Human Metapneumovirus Not Detected     Human Rhinovirus/Enterovirus Not Detected     Influenza A PCR Not Detected     Influenza A H1 Not Detected     Influenza A H1 2009 PCR Not Detected     Influenza A H3 Not Detected     Influenza B PCR Not Detected     Parainfluenza Virus 1 Not Detected     Parainfluenza Virus 2 Not Detected     Parainfluenza Virus 3 Not Detected     Parainfluenza Virus 4 Not Detected     RSV, PCR Not Detected     Bordetella pertussis pcr Not Detected     Bordetella parapertussis PCR Not Detected     Chlamydophila pneumoniae PCR Not Detected     Mycoplasma pneumo by PCR Not Detected    Narrative:        Fact sheet for providers: https://docs.[employer]/wp-content/uploads/VGT4518-1540-ZQ5.1-EUA-Provider-Fact-Sheet-3.pdf    Fact sheet for patients: https://docs.[employer]/wp-content/uploads/NTZ1587-1687-XR5.1-EUA-Patient-Fact-Sheet-1.pdf          ECG/EMG Results (most recent)     Procedure Component Value Units Date/Time    ECG 12 Lead [[id number]] Collected:  08/13/20 0302     Updated:  08/14/20 1708    Narrative:       HEART RATE= 79  bpm  RR Interval= 760  ms  OR Interval= 192  ms  P Horizontal Axis= 10  deg  P Front Axis= 42  deg  QRSD Interval= 126  ms  QT Interval= 384  ms  QRS Axis= -55  deg  T Wave Axis= 101  deg  - ABNORMAL ECG -  Sinus rhythm  Nonspecific IVCD with LAD  Inferior infarct, old  Nonspecific T abnormalities, lateral leads  No previous ECG available for comparison  Electronically Signed By: Davi Hinojosa ([facility]) 14-Aug-2020 16:57:21  Date and Time of Study: 2020-08-13 03:02:12                    Ct Chest Pulmonary Embolism    Result Date: 8/13/2020  1. No evidence of pulmonary embolism. No evidence of an acute intrathoracic process. 2. Borderline enlarged right hilar lymph nodes, nonspecific. 3. Fusiform ascending aortic aneurysm measuring 4.7 cm in diameter. 4. Enlarged pulmonary trunk. This can be seen in the setting of pulmonary arterial hypertension. 5. Coronary artery atherosclerosis. 6. Nonobstructing stones in each kidney. Electronically signed by:  Davey Boo M.D.  8/13/2020 2:51 AM          Xrays, labs reviewed personally by physician.    Medication Review:   I have reviewed the patient's current medication list      Scheduled Meds    albuterol sulfate HFA 2 puff Inhalation 4x Daily - RT   budesonide-formoterol 2 puff Inhalation BID - RT   dexamethasone 6 mg Intravenous Daily   enoxaparin 40 mg Subcutaneous Q12H   furosemide 40 mg Intravenous Daily   insulin lispro 0-9 Units Subcutaneous Q6H   sodium chloride 10 mL Intravenous Q12H   zinc oxide  Topical Q12H       Meds  Infusions    Pharmacy Consult - Steroid Insulin Protocol        Meds PRN  •  acetaminophen **OR** acetaminophen **OR** acetaminophen  •  albuterol sulfate HFA  •  aluminum-magnesium hydroxide-simethicone  •  bisacodyl  •  dextrose  •  dextrose  •  glucagon (human recombinant)  •  hydrALAZINE  •  insulin lispro **AND** insulin lispro  •  ketorolac  •  magnesium hydroxide  •  magnesium sulfate **OR** magnesium sulfate in D5W 1g/100mL (PREMIX)  •  melatonin  •  nitroglycerin  •  ondansetron **OR** ondansetron  •  Pharmacy Consult - Steroid Insulin Protocol  •  potassium chloride **OR** potassium chloride **OR** potassium chloride  •  sodium chloride        Assessment/Plan   Assessment/Plan     Active Hospital Problems    Diagnosis  POA   • **Acute respiratory failure with hypoxia and hypercapnia (CMS/HCC) [J96.01, J96.02]  Yes   • COVID-19 [U07.1]  Yes   • Ascending aortic aneurysm (CMS/HCC) [I71.2]  Yes   • Essential hypertension [I10]  Yes   • Lymphedema [I89.0]  Yes   • Vitamin D deficiency [E55.9]  Yes   • Polyneuropathy [G62.9]  Yes   • Bipolar 1 disorder (CMS/HCC) [F31.9]  Yes   • Shortness of breath [R06.02]  Yes   • Obesity, morbid, BMI 50 or higher (CMS/HCC) [E66.01]  Yes      Resolved Hospital Problems   No resolved problems to display.       MEDICAL DECISION MAKING COMPLEXITY BY PROBLEM:       Acute hypoxic/hypercapnic respiratory failure  - continue AVAPs  - pulmonology consulted  - CT scan reviewed and negative PE   - viral panel still + COVID 19 otherwise negative  - strep and legionella negative  - continue albuterol and symbicort  - continue dexamethasone   - may need trilogy at discharge      COVID 19  - previously diagnosed  - continue supplemental oxygen and steroids  - not a candidate for Remdesivir      Ascending aortic aneurysm  - incidental finding on CT scan, measures 4.7 cm  - needs echo and referral to cardiothoracic surgery once COVID negative     Essential hypertension, chronic,  controlled  - resume amlodipine   - hold lisinopril for now   - monitor with routine VS and make adjustments as needed      Lymphedema  - hold PO Lasix while NPO  - Lasix 40 mg IV daily     Vitamin D deficiency  - hold supplement      Polyneuropathy  - continue Celebrex, Flexeril and gabapentin     Bipolar 1 disorder   - resume Depakote and Seroquel  - stable     Morbid obesity   -  on dietary changes      DVT Prophylaxis  - enoxaparin         VTE Prophylaxis -   Mechanical Order History:     None      Pharmalogical Order History:     Ordered     Dose Route Frequency Stop    08/14/20 1420  enoxaparin (LOVENOX) syringe 40 mg      40 mg SC Every 12 Hours --    08/13/20 0734  enoxaparin (LOVENOX) syringe 90 mg  Status:  Discontinued      0.5 mg/kg SC Every 12 Hours 08/14/20 1420            Code Status -   Code Status and Medical Interventions:   Ordered at: 08/13/20 0734     Code Status:    CPR     Medical Interventions (Level of Support Prior to Arrest):    Full       This patient has been examined wearing appropriate Personal Protective Equipment and discussed with pulmonologist. 08/14/20        Discharge Planning  Return to SNF once breathing improved           Electronically signed by Bree Dan DO, 08/14/20, 19:16.  [facility] Hospitalist Team

## 2020-08-14 NOTE — DISCHARGE PLACEMENT REQUEST
"Dylan Cohen (65 y.o. Male)     Date of Birth Social Security Number Address Home Phone MRN    1955  [address] [facility]  [place] [address] [phone] [id number]    Denominational Marital Status          Christianity        Admission Date Admission Type Admitting Provider Attending Provider Department, Room/Bed    8/13/20 Emergency Bree Dan DO Maddox, [place], DO [facility] 2A PEDIATRICS, 210/1    Discharge Date Discharge Disposition Discharge Destination                       Attending Provider:  Bree Dan DO    Allergies:  Morphine    Isolation:  Enh Drop/Con   Infection:  COVID (confirmed) (08/13/20)   Code Status:  CPR    Ht:  185.4 cm (73\")   Wt:  177 kg (391 lb 5.1 oz)    Admission Cmt:  None   Principal Problem:  Acute respiratory failure with hypoxia and hypercapnia (CMS/HCC) [J96.01,J96.02]                 Active Insurance as of 8/13/2020     Primary Coverage     Payor Plan Insurance Group Employer/Plan Group    [place]      Payor Plan Address Payor Plan Phone Number Payor Plan Fax Number Effective Dates    [address]   8/1/2020 - None Entered    [place] [address]       Subscriber Name Subscriber Birth Date Member ID       DYLAN COHEN 1955 [id number]                 Emergency Contacts      (Rel.) Home Phone Work Phone Mobile Phone    VERONICA JI (Brother) [phone] -- --               History & Physical      Celeste Saxena APRN at 08/13/20 0905     Attestation signed by Bree Dan DO at 08/13/20 2002    For this patient encounter, I reviewed the mid-level provider documentation, medical decision making and treatment plan, and I have personally spent time with the patient.      HPI:  Patient is a 66 y/o morbidly obese male who presented the ED from SNF due to shortness of breath. Patient was positive for COVID previously and sent to the COVID unit at [facility]. Patient apparently became hypoxic overnight. Patient reports his breathing is improved and he wants to take off bipap. He denies any chest pain, nausea or vomiting.     ROS:  A complete 10 point ROS was performed and found to be negative except as stated in the HPI.       Physical Exam:  General: morbidly obese male lying in bed with bipap in place  Eyes: PERRL, nonicteric  HENT: normocephalic, atraumatic, MMM, pharynx clear without erythema or exudate  Card: S1, S2, no murmurs  Resp: diminished bs throughout  GI: soft, nt, nd, +bs  Skin: warm, dry, intact, no rashes  Extremities: +1 no b/l le edema   Neuro: CN II-XII grossly intact, no focal deficits  Psych: appropriate mood and affect      EKG personally reviewed and shows NSR with no acute st-t changes     Medical Decision Making:    Acute hypoxic/hypercapnic respiratory failure  - continue Bipap  - ABG reviewed and will consult pulmonology  - CT scan reviewed and negative PE   - viral panel still + COVID 19 otherwise negative  - strep and legionella negative  - continue albuterol and symbicort  - continue dexamethasone     COVID 19  - previously diagnosed  - continue supplemental oxygen and steroids  - not a candidate for Remdesivir     Ascending aortic aneurysm  -incidental finding on CT scan, measures 4.7 cm  -needs echo and referral to cardiothoracic surgery once COVID negative     Essential hypertension, chronic, controlled  -hold amlodipine and lisinopril while NPO  -PRN IV hydralazine  -monitor BP     Lymphedema  -hold PO Lasix while NPO  -Lasix 40 mg IV daily     Vitamin D deficiency  -hold supplement while NPO     Polyneuropathy  -hold Celebrex, Flexeril and gabapentin while NPO     Bipolar 1 disorder   -hold Depakote and Seroquel while NPO  -monitor     Morbid obesity   -  on dietary changes     DVT Prophylaxis  - enoxaparin                         [facility]      Patient Name: Fox Ramirez  SOLANGE:  1955  MRN: [id number]  Primary Care Physician: Setve Valerio MD  Date of admission: 8/13/2020    Patient Care Team:  Steve Valerio MD as PCP - General (Hospitalist)          Subjective   History Present Illness     Chief Complaint:   Chief Complaint   Patient presents with   • Shortness of Breath       Mr. Ramirez is a 65 y.o. morbidly obese male who was sent to the [facility] ED on 08/13/2020 from [facility] complaining of shortness of breath. The patient is from [place] and recently tested positive for COVID-19. He was sent to [facility] as they have a unit dedicated to COVID positive patients. The patient was reportedly hypoxic overnight with O2 sat mid-80s on room air. He was placed on supplemental oxygen with improvement in his saturation. Upon arrival to the ER here the patient's O2 sat was 91% on 4 liters. His ABG revealed pH 7.308, CO2 81.1, pO2 86.8, HCO3 40.6. He was placed on BiPAP. His CBC and CMP were unremarkable. His proBNP and troponin were normal. His dimer was elevated at 1.19, but his CT chest was negative for pulmonary embolism. He was also noted have an ascending aortic aneurysm measuring 4.7 cm. His EKG showed sinus rhythm. He was admitted for further evaluation and treatment.     The patient is drowsy, but easy to arouse and oriented. He is having difficulty talking while on BiPAP. Nursing home records reviewed. The patient has a history of HTN, lymphedema, Bipolar disorder, polyneuropathy, and vitamin D deficiency. He is currently on metformin at the rehab facility, but denies a history of Type 2 DM or borderline DM. He states he was not previously on metformin. The patient is currently complaining of shortness of breath. He denies any other complaints.       Review of Systems   Respiratory: Positive for shortness of breath.    All other systems reviewed and are negative.        Personal History     Past Medical History:   Past Medical  History:   Diagnosis Date   • Bipolar 1 disorder (CMS/[facility])    • Hypertension    • Lymphedema    • Obesity, morbid, BMI 50 or higher (CMS/[facility]) 8/13/2020   • Polyneuropathy    • Vitamin D deficiency        Surgical History:    History reviewed. No pertinent surgical history.    Family History: Family history is unknown by patient. Otherwise pertinent FHx was reviewed and unremarkable.     Social History: Alcohol use questions deferred to the physician. Drug use questions deferred to the physician.      Medications:  Prior to Admission medications    Medication Sig Start Date End Date Taking? Authorizing Provider   amLODIPine (NORVASC) 10 MG tablet Take 10 mg by mouth Daily.   Yes Malathi Del Rio MD   celecoxib (CeleBREX) 100 MG capsule Take 50 mg by mouth 2 (Two) Times a Day.   Yes Malathi Del Rio MD   Cholecalciferol (VITAMIN D3) 125 MCG (5000 UT) capsule capsule Take 1,000 Units by mouth Daily.   Yes Malathi Del Rio MD   cyclobenzaprine (FLEXERIL) 5 MG tablet Take 5 mg by mouth 2 (Two) Times a Day As Needed for Muscle Spasms.   Yes Malathi Del Rio MD   divalproex (DEPAKOTE) 125 MG DR tablet Take 750 mg by mouth 3 (Three) Times a Day.   Yes Malathi Del Rio MD   furosemide (LASIX) 20 MG tablet Take 20 mg by mouth Daily.   Yes Malathi Del Rio MD   gabapentin (NEURONTIN) 600 MG tablet Take 600 mg by mouth 3 (Three) Times a Day.   Yes Malathi Del Rio MD   lisinopril (PRINIVIL,ZESTRIL) 40 MG tablet Take 40 mg by mouth Daily.   Yes Malathi Del Rio MD   metFORMIN ER (GLUCOPHAGE-XR) 500 MG 24 hr tablet Take 500 mg by mouth Daily With Breakfast.   Yes Malathi Del Rio MD   QUEtiapine (SEROquel) 100 MG tablet Take 200 mg by mouth 2 (Two) Times a Day.   Yes Malathi Del Rio MD   QUEtiapine XR (SEROquel XR) 200 MG 24 hr tablet Take 200 mg by mouth Every Night.   Yes Malathi Del Rio MD   zinc oxide 20 % ointment Apply  topically to the appropriate area as directed  Daily. Apply to buttocks topically daily and prn   Yes Provider, Historical, MD       Allergies:    Allergies   Allergen Reactions   • Morphine Nausea And Vomiting       Objective   Objective     Vital Signs  Temp:  [97.1 °F (36.2 °C)-98.4 °F (36.9 °C)] 97.1 °F (36.2 °C)  Heart Rate:  [65-80] 65  Resp:  [16-20] 18  BP: (124-139)/(61-78) 125/78  SpO2:  [91 %-96 %] 95 %  on  Flow (L/min):  [4] 4;   Device (Oxygen Therapy): NPPV/NIV  Body mass index is 50.66 kg/m².    Physical Exam   Constitutional: He is oriented to person, place, and time. He appears well-developed.   Morbidly obese   HENT:   Head: Normocephalic and atraumatic.   Mouth/Throat: Oropharynx is clear and moist.   Eyes: Pupils are equal, round, and reactive to light. Conjunctivae and EOM are normal.   Neck: Normal range of motion. Neck supple.   Cardiovascular: Normal rate, regular rhythm and intact distal pulses.   Pulmonary/Chest: He has decreased breath sounds.   On BiPAP 60% FiO2   Abdominal: Soft. Bowel sounds are normal. He exhibits no distension. There is no tenderness.   Musculoskeletal: Normal range of motion. He exhibits edema.   BLE   Neurological: He is oriented to person, place, and time.   Drowsy, easy to arouse   Skin: Skin is warm and dry. Capillary refill takes 2 to 3 seconds.   Psychiatric: He has a normal mood and affect. His behavior is normal.   Vitals reviewed.        Results Review:  I have personally reviewed most recent cardiac tracings, lab results and radiology images and interpretations and agree with findings.    Results from last 7 days   Lab Units 08/13/20  0318   WBC 10*3/mm3 5.70   HEMOGLOBIN g/dL 15.4   HEMATOCRIT % 47.5   PLATELETS 10*3/mm3 226   INR  1.00     Results from last 7 days   Lab Units 08/13/20  0318   SODIUM mmol/L 141   POTASSIUM mmol/L 4.6   CHLORIDE mmol/L 99   CO2 mmol/L 36.0*   BUN  17   CREATININE mg/dL 0.78   GLUCOSE mg/dL 97   CALCIUM mg/dL 8.3*   ALT (SGPT) U/L 21   AST (SGOT) U/L 19   TROPONIN T  ng/mL <0.010   PROBNP pg/mL 40.4     Estimated Creatinine Clearance: 159.7 mL/min (by C-G formula based on SCr of 0.78 mg/dL).  Brief Urine Lab Results     None          Microbiology Results (last 10 days)     ** No results found for the last 240 hours. **          ECG/EMG Results (most recent)     Procedure Component Value Units Date/Time    ECG 12 Lead [[id number]] Collected:  08/13/20 0302     Updated:  08/13/20 0303    Narrative:       HEART RATE= 79  bpm  RR Interval= 760  ms  AR Interval= 192  ms  P Horizontal Axis= 10  deg  P Front Axis= 42  deg  QRSD Interval= 126  ms  QT Interval= 384  ms  QRS Axis= -55  deg  T Wave Axis= 101  deg  - ABNORMAL ECG -  Sinus rhythm  Nonspecific IVCD with LAD  Inferior infarct, old  Nonspecific T abnormalities, lateral leads  Electronically Signed By:   Date and Time of Study: 2020-08-13 03:02:12          Ct Chest Pulmonary Embolism    Result Date: 8/13/2020  1. No evidence of pulmonary embolism. No evidence of an acute intrathoracic process. 2. Borderline enlarged right hilar lymph nodes, nonspecific. 3. Fusiform ascending aortic aneurysm measuring 4.7 cm in diameter. 4. Enlarged pulmonary trunk. This can be seen in the setting of pulmonary arterial hypertension. 5. Coronary artery atherosclerosis. 6. Nonobstructing stones in each kidney. Electronically signed by:  Davey Boo M.D.  8/13/2020 2:51 AM      Estimated Creatinine Clearance: 159.7 mL/min (by C-G formula based on SCr of 0.78 mg/dL).      Assessment/Plan   Assessment/Plan       Active Hospital Problems    Diagnosis  POA   • **Acute respiratory failure with hypoxia and hypercapnia (CMS/HCC) [J96.01, J96.02]  Yes     Priority: High   • COVID-19 [U07.1]  Yes     Priority: High   • Shortness of breath [R06.02]  Yes     Priority: Medium   • Ascending aortic aneurysm (CMS/HCC) [I71.2]  Yes   • Essential hypertension [I10]  Yes   • Lymphedema [I89.0]  Yes   • Vitamin D deficiency [E55.9]  Yes   • Polyneuropathy [G62.9]   Yes   • Bipolar 1 disorder (CMS/[facility]) [F31.9]  Yes   • Obesity, morbid, BMI 50 or higher (CMS/HCC) [E66.01]  Yes      Resolved Hospital Problems   No resolved problems to display.       Acute respiratory failure with hypoxia and hypercapnia   -suspect acute lung injury secondary to COVID-19  -CT chest reviewed; no pneumonia or PE noted  -currently on BiPAP  -repeat ABG worse; KPA consulted  -titrate O2 to keep sat>92%  -bronchodilators  -given IV Decadron 6 mg in ER; continue daily x 9 more days    COVID-19  -patient diagnosed a few weeks ago in [place]  -O2 and IV steroids as above  -patient does not qualify for Remdesivir as he was positive a few weeks ago  -monitor COVID-19 progression labs    Shortness of breath  -as above    Ascending aortic aneurysm  -incidental finding on CT scan, measures 4.7 cm  -needs echo and referral to cardiothoracic surgery once COVID negative    Essential hypertension, chronic, controlled  -hold amlodipine and lisinopril while NPO  -PRN IV hydralazine  -monitor BP    Lymphedema  -hold PO Lasix while NPO  -Lasix 40 mg IV daily    Vitamin D deficiency  -hold supplement while NPO    Polyneuropathy  -hold Celebrex, Flexeril and gabapentin while NPO    Bipolar 1 disorder   -hold Depakote and Seroquel while NPO  -monitor     Obesity, morbid, BMI 50 or higher  -BMI 50.66  -encourage lifestyle modifications  -patient on metformin at rehab facility, but denies h/o Type 2 DM or borderline DM-----check A1c, accu checks q6h with SSI, hold metformin while inpatient         VTE Prophylaxis -   Mechanical Order History:     None      Pharmalogical Order History:     Ordered     Dose Route Frequency Stop    08/13/20 0734  enoxaparin (LOVENOX) syringe 90 mg      0.5 mg/kg SC Every 12 Hours --          CODE STATUS:    Code Status and Medical Interventions:   Ordered at: 08/13/20 0734     Code Status:    CPR     Medical Interventions (Level of Support Prior to Arrest):    Full       This patient has  been examined wearing appropriate Personal Protective Equipment. 08/13/20      I discussed the patient's findings and my recommendations with patient, nursing staff and consulting provider.        Electronically signed by SUBHASH Blount, 08/13/20, 9:05 AM.  [facility] Hospitalist Team          Electronically signed by Bree Dan DO at 08/13/20 2002         Current Facility-Administered Medications   Medication Dose Route Frequency Provider Last Rate Last Dose   • acetaminophen (TYLENOL) tablet 650 mg  650 mg Oral Q4H PRN Celeste Saxena APRN        Or   • acetaminophen (TYLENOL) 160 MG/5ML solution 650 mg  650 mg Oral Q4H PRN Celeste Saxena APRN        Or   • acetaminophen (TYLENOL) suppository 650 mg  650 mg Rectal Q4H PRN Celeste Saxena APRN       • albuterol sulfate HFA (PROVENTIL HFA;VENTOLIN HFA;PROAIR HFA) inhaler 2 puff  2 puff Inhalation Q4H PRN Celeste Saxena APRN       • albuterol sulfate HFA (PROVENTIL HFA;VENTOLIN HFA;PROAIR HFA) inhaler 2 puff  2 puff Inhalation 4x Daily - RT Celeste Saxena APRN   2 puff at 08/13/20 1900   • aluminum-magnesium hydroxide-simethicone (MAALOX MAX) 400-400-40 MG/5ML suspension 15 mL  15 mL Oral Q6H PRN Celeste Saxena APRN       • bisacodyl (DULCOLAX) suppository 10 mg  10 mg Rectal Daily PRN Celeste Saxena APRN       • budesonide-formoterol (SYMBICORT) 160-4.5 MCG/ACT inhaler 2 puff  2 puff Inhalation BID - RT Celeste Saxena APRN   2 puff at 08/13/20 2030   • dexamethasone sodium phosphate injection 6 mg  6 mg Intravenous Daily Celeste Saxena APRN   6 mg at 08/14/20 0740   • dextrose (D50W) 25 g/ 50mL Intravenous Solution 25 g  25 g Intravenous Q15 Min PRN Celeste Saxena APRN       • dextrose (GLUTOSE) oral gel 15 g  15 g Oral Q15 Min PRN Celeste Saxena APRN       • enoxaparin (LOVENOX) syringe 90 mg  0.5 mg/kg Subcutaneous Q12H Celeste Saxena APRN   90 mg at 08/14/20 0740   • furosemide (LASIX) injection 40 mg  40 mg Intravenous Daily Celeste Saxena APRN   40  mg at 08/14/20 0739   • glucagon (human recombinant) (GLUCAGEN DIAGNOSTIC) injection 1 mg  1 mg Subcutaneous PRN Saxena, Celeste, APRN       • hydrALAZINE (APRESOLINE) injection 10 mg  10 mg Intravenous Q6H PRN Saxena, Celeste, APRN       • insulin lispro (humaLOG) injection 0-9 Units  0-9 Units Subcutaneous Q6H Saxena, Celeste, APRN        And   • insulin lispro (humaLOG) injection 0-9 Units  0-9 Units Subcutaneous PRN Saxena, Celeste, APRN       • ketorolac (TORADOL) injection 15 mg  15 mg Intravenous Q6H PRN Saxena, Celeste, APRN       • magnesium hydroxide (MILK OF MAGNESIA) suspension 2400 mg/10mL 10 mL  10 mL Oral Daily PRN Saxena, Celeste, APRN       • Magnesium Sulfate 2 gram infusion - Mg less than or equal to 1.5 mg/dL  2 g Intravenous PRN Saxena, Celeste, APRN        Or   • Magnesium Sulfate 1 gram infusion - Mg 1.6-1.9 mg/dL  1 g Intravenous PRN Saxena, Celeste, APRN       • melatonin tablet 5 mg  5 mg Oral Nightly PRN Saxena, Celeste, APRN       • nitroglycerin (NITROSTAT) SL tablet 0.4 mg  0.4 mg Sublingual Q5 Min PRN Saxena, Celeste, APRN       • ondansetron (ZOFRAN) tablet 4 mg  4 mg Oral Q6H PRN Saxena, Celeste, APRN        Or   • ondansetron (ZOFRAN) injection 4 mg  4 mg Intravenous Q6H PRN Saxena, Celeste, APRN       • Pharmacy Consult - Steroid Insulin Protocol   Does not apply Continuous PRN Saxena, Celeste, APRN       • potassium chloride (K-DUR,KLOR-CON) CR tablet 40 mEq  40 mEq Oral PRN Saxena, Celeste, APRN        Or   • potassium chloride (KLOR-CON) packet 40 mEq  40 mEq Oral PRN Saxena, Celeste, APRN        Or   • potassium chloride 10 mEq in 100 mL IVPB  10 mEq Intravenous Q1H PRN Saxena, Celeste, APRN       • sodium chloride 0.9 % flush 10 mL  10 mL Intravenous Q12H Saxena, Celeste, APRN   10 mL at 08/14/20 0740   • sodium chloride 0.9 % flush 10 mL  10 mL Intravenous PRN Celeste Saxena APRN       • zinc oxide 20 % ointment   Topical Daily Celeste Saxena APRN

## 2020-08-14 NOTE — PLAN OF CARE
Problem: Patient Care Overview  Goal: Plan of Care Review  Outcome: Ongoing (interventions implemented as appropriate)  Note:   Continue to monitor

## 2020-08-14 NOTE — PLAN OF CARE
Problem: Patient Care Overview  Goal: Plan of Care Review  Outcome: Ongoing (interventions implemented as appropriate)  Flowsheets  Taken 8/14/2020 9750  Plan of Care Reviewed With: patient  Taken 8/14/2020 5645  Outcome Summary: Pt. currently on 15L high flow nasal canula with Oxygen sats in the upper 90's VSS, no complaints voiced at this time. Will continue to monitor.  Goal: Individualization and Mutuality  Outcome: Ongoing (interventions implemented as appropriate)  Goal: Discharge Needs Assessment  Outcome: Ongoing (interventions implemented as appropriate)  Goal: Interprofessional Rounds/Family Conf  Outcome: Ongoing (interventions implemented as appropriate)     Problem: Fall Risk (Adult)  Goal: Identify Related Risk Factors and Signs and Symptoms  Outcome: Ongoing (interventions implemented as appropriate)  Goal: Absence of Fall  Outcome: Ongoing (interventions implemented as appropriate)  Goal: Identify Related Risk Factors and Signs and Symptoms  Outcome: Ongoing (interventions implemented as appropriate)  Goal: Absence of Fall  Outcome: Ongoing (interventions implemented as appropriate)     Problem: Skin Injury Risk (Adult)  Goal: Identify Related Risk Factors and Signs and Symptoms  Outcome: Ongoing (interventions implemented as appropriate)  Goal: Skin Health and Integrity  Outcome: Ongoing (interventions implemented as appropriate)

## 2020-08-14 NOTE — PROGRESS NOTES
Discharge Planning Assessment  [facility]     Patient Name: Fox Ramirez  MRN: [id number]  Today's Date: 8/14/2020    Admit Date: 8/13/2020    Discharge Needs Assessment     Row Name 08/14/20 0857       Living Environment    Current Living Arrangements  extended care facility    Duration at Residence  [facility].    Primary Care Provided by  other (see comments)    Caregiving Concerns  COVID +.    Family Caregiver Names  Kevin Mak brother, left VM 8/13 and 8/14.    Able to Return to Prior Arrangements  yes    Living Arrangement Comments  From [facility]       Resource/Environmental Concerns    Resource/Environmental Concerns  none       Transition Planning    Patient/Family Anticipates Transition to  long term care facility    Patient/Family Anticipated Services at Transition      Transportation Anticipated  health plan transportation       Discharge Needs Assessment    Readmission Within the Last 30 Days  no previous admission in last 30 days    Concerns to be Addressed  no discharge needs identified    Patient's Choice of Community Agency(s)  Current [facility]    Discharge Coordination/Progress  DC Plan: return to [facility] LTC, no precert or PASRR required.        Discharge Plan     Row Name 08/14/20 0902       Plan    Plan  DC Plan: return to [facility] LTC, no precert or PASRR required.    Plan Comments  No response from brother.         Destination      Service Provider Request Status Selected Services Address Phone Number Fax Number    [facility] Pending - Request Sent N/A [address] [address] [phone]                Demographic Summary     Row Name 08/14/20 0850       General Information    Admission Type  inpatient    Arrived From  emergency department    Referral Source  admission list    Reason for Consult  discharge planning    Preferred Language  English    General  Information Comments  VM left for brother [phone]. Pt on BIPAP, not available per phone on COVID unit.                      Natali Dia RN

## 2020-08-14 NOTE — NURSING NOTE
Discussed with bedside RN who saw patients sacral/coccyx area yesterday 8/13 and nurses aid. They report skin to this area is red but intact. Recommend continue skin at risk pressure injury prevention strategies and continue treatment of zinc moisture barrier as ordered.

## 2020-08-14 NOTE — PROGRESS NOTES
KPA/PULM/CC PROGRESS NOTE    65 y.o. male who was referred for consultation for shortness of breath and respiratory failure.  Patient is a morbidly obese male with remote history of tobacco use but he denies any previous history of COPD or emphysema.  Patient was recently diagnosed with COVID-19 infection and in [place] a few weeks ago.  He subsequently was transferred to a local nursing home.  Patient was noted to have some low O2 sats yesterday.  He was placed on 3 L nasal cannula and was subsequently transferred to the ER for further evaluation.  Patient was evaluated in the ER.  He required BiPAP therapy and was admitted to the hospital.  I have been asked to see him for further evaluation.  At time of my evaluation patient awakens easily.  He does complain of some shortness of breath.  He does complain of some cough.  He does complain of some nausea and diarrhea.  He denies any chest pain or palpitations.  He denies any vomiting.  His shortness of breath gets worse with any activity.  It gets better with resting and use of supplemental oxygen   SUBJECTIVE    8/14: Awake.  Remains on noninvasive ventilation.  More awake and appropriate today.  Breathing some better.  No complaints of chest pain.  No nausea or vomiting  OBJECTIVE    Vitals:    08/14/20 0948 08/14/20 0952 08/14/20 1139 08/14/20 1140   BP:       BP Location:       Patient Position:       Pulse: 67 68 72 72   Resp: 15 16 14 15   Temp:       TempSrc:       SpO2: 94%  95% 94%   Weight:       Height:          Intake/Output last 3 shifts:  I/O last 3 completed shifts:  In: -   Out: 400 [Urine:400]  Intake/Output this shift:  I/O this shift:  In: -   Out: 440 [Urine:440]    Constitutional: Morbidly obese, chronically ill-appearing, no acute distress, non-toxic appearance   Eyes:   conjunctiva normal   HENT:  Atraumatic, external ears normal, nose normal, oropharynx moist, no pharyngeal exudates.   Neck- normal range of motion, no tenderness, supple     Respiratory:  No respiratory distress, normal breath sounds, no rales, no wheezing   Cardiovascular:  Normal rate, normal rhythm, no murmurs, no gallops, no rubs   GI:  Soft, nondistended, normal bowel sounds, nontender, no organomegaly, no mass, no rebound, no guarding   :  No costovertebral angle tenderness   Musculoskeletal:  + edema, no tenderness, no deformities. Back- no tenderness  Integument:  Well hydrated, no rash   Lymphatic:  No lymphadenopathy noted   Neurologic: Awake, CN 2-12 normal, normal motor function, normal sensory function, no focal deficits noted   Psychiatric:  Speech and behavior appropriate     Scheduled Meds:  albuterol sulfate HFA 2 puff Inhalation 4x Daily - RT   budesonide-formoterol 2 puff Inhalation BID - RT   dexamethasone 6 mg Intravenous Daily   enoxaparin 0.5 mg/kg Subcutaneous Q12H   furosemide 40 mg Intravenous Daily   insulin lispro 0-9 Units Subcutaneous Q6H   sodium chloride 10 mL Intravenous Q12H   zinc oxide  Topical Daily       Continuous Infusions:  Pharmacy Consult - Steroid Insulin Protocol        PRN Meds:•  acetaminophen **OR** acetaminophen **OR** acetaminophen  •  albuterol sulfate HFA  •  aluminum-magnesium hydroxide-simethicone  •  bisacodyl  •  dextrose  •  dextrose  •  glucagon (human recombinant)  •  hydrALAZINE  •  insulin lispro **AND** insulin lispro  •  ketorolac  •  magnesium hydroxide  •  magnesium sulfate **OR** magnesium sulfate in D5W 1g/100mL (PREMIX)  •  melatonin  •  nitroglycerin  •  ondansetron **OR** ondansetron  •  Pharmacy Consult - Steroid Insulin Protocol  •  potassium chloride **OR** potassium chloride **OR** potassium chloride  •  sodium chloride     LABS    CBC  Results from last 7 days   Lab Units 08/14/20  0716 08/13/20  0318   WBC 10*3/mm3 8.30 5.70   RBC 10*6/mm3 5.15 5.12   HEMOGLOBIN g/dL 15.4 15.4   HEMATOCRIT % 47.3 47.5   MCV fL 91.7 92.7   PLATELETS 10*3/mm3 115* 226       CMP Results from last 7 days   Lab Units  08/14/20  0716 08/13/20  0318   SODIUM mmol/L 138 141   POTASSIUM mmol/L 4.4 4.6   CHLORIDE mmol/L 97* 99   CO2 mmol/L 34.0* 36.0*   BUN   --  17   CREATININE mg/dL 0.71* 0.78   GLUCOSE mg/dL 106* 97   ALBUMIN g/dL 3.50 3.50   BILIRUBIN mg/dL 0.5 0.4   ALK PHOS U/L 42 40   AST (SGOT) U/L 26 19   ALT (SGPT) U/L 26 21       TROPONIN  Results from last 7 days   Lab Units 08/14/20  0716 08/13/20  0318   CK TOTAL U/L 34 45   TROPONIN T ng/mL  --  <0.010       CoAg  Results from last 7 days   Lab Units 08/13/20  0318   INR  1.00   APTT seconds 29.3       ABG  Results from last 7 days   Lab Units 08/13/20  0815 08/13/20  0343   PH, ARTERIAL pH units 7.285* 7.308*   PCO2, ARTERIAL mm Hg 82.7* 81.1*   PO2 ART mm Hg 94.8 86.8   O2 SATURATION ART % 95.7 94.8   BASE EXCESS ART mmol/L 8.1* 9.6*       Microbiology  Microbiology Results (last 10 days)     Procedure Component Value - Date/Time    S. Pneumo Ag Urine or CSF - Urine, Urine, Clean Catch [[id number]]  (Normal) Collected:  08/13/20 1810    Lab Status:  Final result Specimen:  Urine, Clean Catch Updated:  08/13/20 1856     Strep Pneumo Ag Negative    Legionella Antigen, Urine - Urine, Urine, Clean Catch [[id number]]  (Normal) Collected:  08/13/20 1810    Lab Status:  Final result Specimen:  Urine, Clean Catch Updated:  08/13/20 1855     LEGIONELLA ANTIGEN, URINE Negative    Respiratory Panel PCR w/COVID-19(SARS-CoV-2) [facility]/JESSICA/[facility] In-House, NP Swab in UTM/VTM, 3-4 HR TAT - Swab, Nasopharynx [[id number]]  (Abnormal) Collected:  08/13/20 1253    Lab Status:  Final result Specimen:  Swab from Nasopharynx Updated:  08/13/20 1416     ADENOVIRUS, PCR Not Detected     Coronavirus 229E Not Detected     Coronavirus HKU1 Not Detected     Coronavirus NL63 Not Detected     Coronavirus OC43 Not Detected     COVID19 Detected     Human Metapneumovirus Not Detected     Human Rhinovirus/Enterovirus Not Detected     Influenza A PCR Not Detected     Influenza A H1 Not Detected     Influenza  A H1 2009 PCR Not Detected     Influenza A H3 Not Detected     Influenza B PCR Not Detected     Parainfluenza Virus 1 Not Detected     Parainfluenza Virus 2 Not Detected     Parainfluenza Virus 3 Not Detected     Parainfluenza Virus 4 Not Detected     RSV, PCR Not Detected     Bordetella pertussis pcr Not Detected     Bordetella parapertussis PCR Not Detected     Chlamydophila pneumoniae PCR Not Detected     Mycoplasma pneumo by PCR Not Detected    Narrative:       Fact sheet for providers: https://docs.[employer]/wp-content/uploads/SVI4888-0732-BG0.1-EUA-Provider-Fact-Sheet-3.pdf    Fact sheet for patients: https://docs.[employer]/wp-content/uploads/OWO4988-8716-FW2.1-EUA-Patient-Fact-Sheet-1.pdf          IMAGING & OTHER STUDIES    Imaging Results (Last 72 Hours)     Procedure Component Value Units Date/Time    CT Chest Pulmonary Embolism [[id number]] Collected:  08/13/20 0245     Updated:  08/13/20 0452    Narrative:       CT ANGIOGRAM OF THE CHEST    INDICATION: Cough, shortness of breath. Coronavirus positive.    TECHNIQUE: CT scan of the chest was performed following the administration of 100 mL Isovue-370 intravenous contrast. Imaging was performed to optimize evaluation of the pulmonary arterial system. CT dose lowering techniques were used, to include:   automated exposure control, adjustment for patient size, and / or use of iterative reconstruction. MIP images were also generated.    COMPARISON: None    FINDINGS:  Vasculature: There are no filling defects in the central, lobar, proximal segmental or distal segmental pulmonary arteries. There is no evidence of right heart strain or pulmonary infarction. Coronary artery calcifications are present. There is fusiform   aneurysmal dilatation of the ascending aorta measuring 4.7 cm in diameter. The pulmonary trunk is also enlarged measuring 3.5 cm in diameter.    Mediastinum / Pleura: There is no pericardial or pleural effusion. Borderline right hilar lymph  nodes are present.    Airways / Lungs: The central airways are patent.  There is no pneumothorax or consolidative parenchymal disease. Subsegmental atelectatic changes are present in both lung bases.    Bones: No destructive osseous lesion is identified.    Upper Abdomen: Limited images below the diaphragm demonstrate no acute abnormality. Nonobstructing stones are present in each kidney.      Impression:         1. No evidence of pulmonary embolism. No evidence of an acute intrathoracic process.  2. Borderline enlarged right hilar lymph nodes, nonspecific.  3. Fusiform ascending aortic aneurysm measuring 4.7 cm in diameter.  4. Enlarged pulmonary trunk. This can be seen in the setting of pulmonary arterial hypertension.  5. Coronary artery atherosclerosis.  6. Nonobstructing stones in each kidney.    Electronically signed by:  Davey Boo M.D.    8/13/2020 2:51 AM                ASSESSMENT/PLAN:     Acute respiratory failure with hypoxia and hypercapnia (CMS/HCC)    COVID-19    Ascending aortic aneurysm (CMS/HCC)    Essential hypertension    Lymphedema    Vitamin D deficiency    Polyneuropathy    Bipolar 1 disorder (CMS/HCC)    Shortness of breath    Obesity, morbid, BMI 50 or higher (CMS/HCC)  PLAN:  I reviewed her CT scan of the chest films.  I also reviewed his ABG.  He likely has acute on chronic hypercapnic respiratory failure likely from his obesity hypoventilation syndrome.  We will continue with noninvasive ventilation.  BiPAP was ineffective so he was switched to AVAPS settings.   His repeat ABG from today is pending.  His CT scan of the chest is concerning for pulmonary hypertension.  We will proceed with a 2D echo once he is more stable and hopefully after his COVID-19 testing is negative.  Repeat COVID-19 testing is pending.  Diuresis as appropriate  We will wean FiO2 as appropriate.  He is on Lovenox for DVT prophylaxis.  We will make further recommendations depending on his progress.  He may  benefit from trilogy noninvasive ventilation at discharge.     THIS DOCUMENT HAS BEEN ELECTRONICALLY SIGNED BY  Jayce Newby MD  11:59 AM

## 2020-08-15 LAB
ALBUMIN SERPL-MCNC: 3.5 G/DL (ref 3.5–5.2)
ALBUMIN/GLOB SERPL: 1.3 G/DL
ALP SERPL-CCNC: 37 U/L (ref 39–117)
ALT SERPL W P-5'-P-CCNC: 30 U/L (ref 1–41)
ANION GAP SERPL CALCULATED.3IONS-SCNC: 9 MMOL/L (ref 5–15)
ARTERIAL PATENCY WRIST A: POSITIVE
AST SERPL-CCNC: 30 U/L (ref 1–40)
ATMOSPHERIC PRESS: ABNORMAL MM[HG]
BASE EXCESS BLDA CALC-SCNC: 9.8 MMOL/L (ref 0–3)
BASOPHILS # BLD AUTO: 0 10*3/MM3 (ref 0–0.2)
BASOPHILS NFR BLD AUTO: 0.4 % (ref 0–1.5)
BDY SITE: ABNORMAL
BILIRUB SERPL-MCNC: 0.4 MG/DL (ref 0–1.2)
BUN SERPL-MCNC: 23 MG/DL (ref 8–23)
BUN SERPL-MCNC: ABNORMAL MG/DL
BUN/CREAT SERPL: ABNORMAL
CALCIUM SPEC-SCNC: 8.7 MG/DL (ref 8.6–10.5)
CHLORIDE SERPL-SCNC: 96 MMOL/L (ref 98–107)
CK SERPL-CCNC: 35 U/L (ref 20–200)
CO2 BLDA-SCNC: 40.8 MMOL/L (ref 22–29)
CO2 SERPL-SCNC: 33 MMOL/L (ref 22–29)
CREAT SERPL-MCNC: 0.71 MG/DL (ref 0.76–1.27)
CRP SERPL-MCNC: 0.21 MG/DL (ref 0–0.5)
DEPRECATED RDW RBC AUTO: 45.1 FL (ref 37–54)
EOSINOPHIL # BLD AUTO: 0 10*3/MM3 (ref 0–0.4)
EOSINOPHIL NFR BLD AUTO: 0.1 % (ref 0.3–6.2)
ERYTHROCYTE [DISTWIDTH] IN BLOOD BY AUTOMATED COUNT: 13.7 % (ref 12.3–15.4)
FERRITIN SERPL-MCNC: 82.3 NG/ML (ref 30–400)
GFR SERPL CREATININE-BSD FRML MDRD: 112 ML/MIN/1.73
GLOBULIN UR ELPH-MCNC: 2.8 GM/DL
GLUCOSE BLDC GLUCOMTR-MCNC: 130 MG/DL (ref 70–105)
GLUCOSE BLDC GLUCOMTR-MCNC: 182 MG/DL (ref 70–105)
GLUCOSE BLDC GLUCOMTR-MCNC: 216 MG/DL (ref 70–105)
GLUCOSE SERPL-MCNC: 119 MG/DL (ref 65–99)
HCO3 BLDA-SCNC: 38.7 MMOL/L (ref 21–28)
HCT VFR BLD AUTO: 44.3 % (ref 37.5–51)
HEMODILUTION: NO
HGB BLD-MCNC: 14.5 G/DL (ref 13–17.7)
INHALED O2 CONCENTRATION: 60 %
INSPIRATORY TIME: 20
LYMPHOCYTES # BLD AUTO: 1.2 10*3/MM3 (ref 0.7–3.1)
LYMPHOCYTES NFR BLD AUTO: 15.2 % (ref 19.6–45.3)
MAGNESIUM SERPL-MCNC: 2.2 MG/DL (ref 1.6–2.4)
MCH RBC QN AUTO: 30.6 PG (ref 26.6–33)
MCHC RBC AUTO-ENTMCNC: 32.7 G/DL (ref 31.5–35.7)
MCV RBC AUTO: 93.4 FL (ref 79–97)
MODALITY: ABNORMAL
MONOCYTES # BLD AUTO: 0.6 10*3/MM3 (ref 0.1–0.9)
MONOCYTES NFR BLD AUTO: 8 % (ref 5–12)
NEUTROPHILS NFR BLD AUTO: 5.9 10*3/MM3 (ref 1.7–7)
NEUTROPHILS NFR BLD AUTO: 76.3 % (ref 42.7–76)
NRBC BLD AUTO-RTO: 0.1 /100 WBC (ref 0–0.2)
PCO2 BLDA: 67.2 MM HG (ref 35–48)
PEEP RESPIRATORY: 4 CM[H2O]
PH BLDA: 7.37 PH UNITS (ref 7.35–7.45)
PLATELET # BLD AUTO: 207 10*3/MM3 (ref 140–450)
PMV BLD AUTO: 9.3 FL (ref 6–12)
PO2 BLDA: 97.2 MM HG (ref 83–108)
POTASSIUM SERPL-SCNC: 4.4 MMOL/L (ref 3.5–5.2)
PROT SERPL-MCNC: 6.3 G/DL (ref 6–8.5)
RBC # BLD AUTO: 4.74 10*6/MM3 (ref 4.14–5.8)
RESPIRATORY RATE: 24
SAO2 % BLDCOA: 97 % (ref 94–98)
SODIUM SERPL-SCNC: 138 MMOL/L (ref 136–145)
VENTILATOR MODE: ABNORMAL
WBC # BLD AUTO: 7.7 10*3/MM3 (ref 3.4–10.8)

## 2020-08-15 PROCEDURE — 83735 ASSAY OF MAGNESIUM: CPT | Performed by: NURSE PRACTITIONER

## 2020-08-15 PROCEDURE — 94660 CPAP INITIATION&MGMT: CPT

## 2020-08-15 PROCEDURE — 36600 WITHDRAWAL OF ARTERIAL BLOOD: CPT

## 2020-08-15 PROCEDURE — 94799 UNLISTED PULMONARY SVC/PX: CPT

## 2020-08-15 PROCEDURE — 25010000002 DEXAMETHASONE SODIUM PHOSPHATE 10 MG/ML SOLUTION: Performed by: NURSE PRACTITIONER

## 2020-08-15 PROCEDURE — 82728 ASSAY OF FERRITIN: CPT | Performed by: NURSE PRACTITIONER

## 2020-08-15 PROCEDURE — 82962 GLUCOSE BLOOD TEST: CPT

## 2020-08-15 PROCEDURE — 82550 ASSAY OF CK (CPK): CPT | Performed by: NURSE PRACTITIONER

## 2020-08-15 PROCEDURE — 85025 COMPLETE CBC W/AUTO DIFF WBC: CPT | Performed by: INTERNAL MEDICINE

## 2020-08-15 PROCEDURE — 80053 COMPREHEN METABOLIC PANEL: CPT | Performed by: INTERNAL MEDICINE

## 2020-08-15 PROCEDURE — 99232 SBSQ HOSP IP/OBS MODERATE 35: CPT | Performed by: INTERNAL MEDICINE

## 2020-08-15 PROCEDURE — 86140 C-REACTIVE PROTEIN: CPT | Performed by: NURSE PRACTITIONER

## 2020-08-15 PROCEDURE — 25010000002 FUROSEMIDE PER 20 MG: Performed by: NURSE PRACTITIONER

## 2020-08-15 PROCEDURE — 25010000002 ENOXAPARIN PER 10 MG: Performed by: INTERNAL MEDICINE

## 2020-08-15 PROCEDURE — 82803 BLOOD GASES ANY COMBINATION: CPT

## 2020-08-15 RX ORDER — LISINOPRIL 20 MG/1
40 TABLET ORAL
Status: DISCONTINUED | OUTPATIENT
Start: 2020-08-15 | End: 2020-08-20 | Stop reason: HOSPADM

## 2020-08-15 RX ADMIN — ALBUTEROL SULFATE 2 PUFF: 90 AEROSOL, METERED RESPIRATORY (INHALATION) at 14:53

## 2020-08-15 RX ADMIN — GABAPENTIN 600 MG: 300 CAPSULE ORAL at 22:08

## 2020-08-15 RX ADMIN — QUETIAPINE 200 MG: 100 TABLET, FILM COATED ORAL at 22:09

## 2020-08-15 RX ADMIN — LISINOPRIL 40 MG: 20 TABLET ORAL at 22:08

## 2020-08-15 RX ADMIN — BUDESONIDE AND FORMOTEROL FUMARATE DIHYDRATE 2 PUFF: 160; 4.5 AEROSOL RESPIRATORY (INHALATION) at 18:44

## 2020-08-15 RX ADMIN — ENOXAPARIN SODIUM 40 MG: 40 INJECTION SUBCUTANEOUS at 09:20

## 2020-08-15 RX ADMIN — GABAPENTIN 600 MG: 300 CAPSULE ORAL at 17:21

## 2020-08-15 RX ADMIN — ENOXAPARIN SODIUM 40 MG: 40 INJECTION SUBCUTANEOUS at 22:08

## 2020-08-15 RX ADMIN — Medication 10 ML: at 09:20

## 2020-08-15 RX ADMIN — ALBUTEROL SULFATE 2 PUFF: 90 AEROSOL, METERED RESPIRATORY (INHALATION) at 07:46

## 2020-08-15 RX ADMIN — ALBUTEROL SULFATE 2 PUFF: 90 AEROSOL, METERED RESPIRATORY (INHALATION) at 18:45

## 2020-08-15 RX ADMIN — FUROSEMIDE 20 MG: 20 TABLET ORAL at 09:19

## 2020-08-15 RX ADMIN — CELECOXIB 100 MG: 100 CAPSULE ORAL at 09:19

## 2020-08-15 RX ADMIN — GABAPENTIN 600 MG: 300 CAPSULE ORAL at 06:11

## 2020-08-15 RX ADMIN — ALBUTEROL SULFATE 2 PUFF: 90 AEROSOL, METERED RESPIRATORY (INHALATION) at 11:06

## 2020-08-15 RX ADMIN — Medication 10 ML: at 22:20

## 2020-08-15 RX ADMIN — DEXAMETHASONE SODIUM PHOSPHATE 6 MG: 10 INJECTION, SOLUTION INTRAMUSCULAR; INTRAVENOUS at 09:19

## 2020-08-15 RX ADMIN — DIVALPROEX SODIUM 750 MG: 250 TABLET, DELAYED RELEASE ORAL at 17:22

## 2020-08-15 RX ADMIN — ZINC OXIDE: 200 OINTMENT TOPICAL at 22:20

## 2020-08-15 RX ADMIN — QUETIAPINE FUMARATE 200 MG: 50 TABLET, EXTENDED RELEASE ORAL at 22:11

## 2020-08-15 RX ADMIN — DIVALPROEX SODIUM 750 MG: 250 TABLET, DELAYED RELEASE ORAL at 09:19

## 2020-08-15 RX ADMIN — CELECOXIB 100 MG: 100 CAPSULE ORAL at 22:12

## 2020-08-15 RX ADMIN — AMLODIPINE BESYLATE 10 MG: 5 TABLET ORAL at 09:19

## 2020-08-15 RX ADMIN — DIVALPROEX SODIUM 750 MG: 250 TABLET, DELAYED RELEASE ORAL at 22:10

## 2020-08-15 RX ADMIN — FUROSEMIDE 40 MG: 10 INJECTION, SOLUTION INTRAMUSCULAR; INTRAVENOUS at 09:19

## 2020-08-15 RX ADMIN — QUETIAPINE 200 MG: 100 TABLET, FILM COATED ORAL at 09:19

## 2020-08-15 RX ADMIN — BUDESONIDE AND FORMOTEROL FUMARATE DIHYDRATE 2 PUFF: 160; 4.5 AEROSOL RESPIRATORY (INHALATION) at 07:45

## 2020-08-15 NOTE — PROGRESS NOTES
KPA/PULM/CC PROGRESS NOTE    65 y.o. male who was referred for consultation for shortness of breath and respiratory failure.  Patient is a morbidly obese male with remote history of tobacco use but he denies any previous history of COPD or emphysema.  Patient was recently diagnosed with COVID-19 infection and in [place] a few weeks ago.  He subsequently was transferred to a local nursing home.  Patient was noted to have some low O2 sats yesterday.  He was placed on 3 L nasal cannula and was subsequently transferred to the ER for further evaluation.  Patient was evaluated in the ER.  He required BiPAP therapy and was admitted to the hospital.  I have been asked to see him for further evaluation.  At time of my evaluation patient awakens easily.  He does complain of some shortness of breath.  He does complain of some cough.  He does complain of some nausea and diarrhea.  He denies any chest pain or palpitations.  He denies any vomiting.  His shortness of breath gets worse with any activity.  It gets better with resting and use of supplemental oxygen   SUBJECTIVE    8/14: Awake.  Remains on noninvasive ventilation.  More awake and appropriate today.  Breathing some better.  No complaints of chest pain.  No nausea or vomiting  8/15: Currently tolerating 15 L high flow.  Afebrile overnight.  Blood pressure stable    OBJECTIVE    Vitals:    08/15/20 0526 08/15/20 0745 08/15/20 1100 08/15/20 1106   BP: 125/78      BP Location: Right arm      Patient Position: Lying      Pulse: 65  70    Resp: 18  18    Temp: 96.1 °F (35.6 °C)      TempSrc: Axillary      SpO2: 96% 98% 95% 94%   Weight:       Height:          Intake/Output last 3 shifts:  I/O last 3 completed shifts:  In: 1200 [P.O.:1200]  Out: 1300 [Urine:1300]  Intake/Output this shift:  I/O this shift:  In: 240 [P.O.:240]  Out: 400 [Urine:400]    Constitutional: Morbidly obese, chronically ill-appearing, no acute distress, non-toxic appearance   Eyes:   conjunctiva  normal   HENT:  Atraumatic, external ears normal, nose normal  Neck- normal range of motion, no tenderness, supple   Respiratory:  No respiratory distress, normal breath sounds, no rales, no wheezing   Cardiovascular:  Normal rate, normal rhythm, no murmurs, no gallops, no rubs   GI:  Soft, nondistended, normal bowel sounds, nontender, no rebound, no guarding   Musculoskeletal:  + edema, no tenderness, no deformities.  Integument:  Well hydrated, no rash   Neurologic: Awake, CN 2-12 normal, normal motor function, normal sensory function, no focal deficits noted   Psychiatric:  Speech and behavior appropriate     Scheduled Meds:    albuterol sulfate HFA 2 puff Inhalation 4x Daily - RT   amLODIPine 10 mg Oral Daily   budesonide-formoterol 2 puff Inhalation BID - RT   celecoxib 100 mg Oral BID   dexamethasone 6 mg Intravenous Daily   divalproex 750 mg Oral TID   enoxaparin 40 mg Subcutaneous Q12H   furosemide 40 mg Intravenous Daily   furosemide 20 mg Oral Daily   gabapentin 600 mg Oral Q8H   insulin lispro 0-9 Units Subcutaneous Q6H   QUEtiapine 200 mg Oral BID   QUEtiapine  mg Oral Nightly   sodium chloride 10 mL Intravenous Q12H   zinc oxide  Topical Q12H       Continuous Infusions:    Pharmacy Consult - Steroid Insulin Protocol        PRN Meds:•  acetaminophen **OR** acetaminophen **OR** acetaminophen  •  albuterol sulfate HFA  •  aluminum-magnesium hydroxide-simethicone  •  bisacodyl  •  cyclobenzaprine  •  dextrose  •  dextrose  •  glucagon (human recombinant)  •  hydrALAZINE  •  insulin lispro **AND** insulin lispro  •  magnesium hydroxide  •  magnesium sulfate **OR** magnesium sulfate in D5W 1g/100mL (PREMIX)  •  melatonin  •  nitroglycerin  •  ondansetron **OR** ondansetron  •  Pharmacy Consult - Steroid Insulin Protocol  •  potassium chloride **OR** potassium chloride **OR** potassium chloride  •  sodium chloride     LABS    CBC  Results from last 7 days   Lab Units 08/15/20  0315 08/14/20  0716  08/13/20  0318   WBC 10*3/mm3 7.70 8.30 5.70   RBC 10*6/mm3 4.74 5.15 5.12   HEMOGLOBIN g/dL 14.5 15.4 15.4   HEMATOCRIT % 44.3 47.3 47.5   MCV fL 93.4 91.7 92.7   PLATELETS 10*3/mm3 207 115* 226       CMP   Results from last 7 days   Lab Units 08/15/20  0315 08/14/20  0716 08/13/20  0318   SODIUM mmol/L 138 138 141   POTASSIUM mmol/L 4.4 4.4 4.6   CHLORIDE mmol/L 96* 97* 99   CO2 mmol/L 33.0* 34.0* 36.0*   BUN  23 22 17   CREATININE mg/dL 0.71* 0.71* 0.78   GLUCOSE mg/dL 119* 106* 97   ALBUMIN g/dL 3.50 3.50 3.50   BILIRUBIN mg/dL 0.4 0.5 0.4   ALK PHOS U/L 37* 42 40   AST (SGOT) U/L 30 26 19   ALT (SGPT) U/L 30 26 21       TROPONIN  Results from last 7 days   Lab Units 08/15/20  0315  08/13/20  0318   CK TOTAL U/L 35   < > 45   TROPONIN T ng/mL  --   --  <0.010    < > = values in this interval not displayed.       CoAg  Results from last 7 days   Lab Units 08/13/20 0318   INR  1.00   APTT seconds 29.3       ABG  Results from last 7 days   Lab Units 08/15/20  0347 08/14/20  1140 08/13/20  0815 08/13/20  0343   PH, ARTERIAL pH units 7.368 7.369 7.285* 7.308*   PCO2, ARTERIAL mm Hg 67.2* 70.5* 82.7* 81.1*   PO2 ART mm Hg 97.2 87.2 94.8 86.8   O2 SATURATION ART % 97.0 95.8 95.7 94.8   BASE EXCESS ART mmol/L 9.8* 10.8* 8.1* 9.6*       Microbiology  Microbiology Results (last 10 days)     Procedure Component Value - Date/Time    S. Pneumo Ag Urine or CSF - Urine, Urine, Clean Catch [[id number]]  (Normal) Collected:  08/13/20 1810    Lab Status:  Final result Specimen:  Urine, Clean Catch Updated:  08/13/20 1856     Strep Pneumo Ag Negative    Legionella Antigen, Urine - Urine, Urine, Clean Catch [[id number]]  (Normal) Collected:  08/13/20 1810    Lab Status:  Final result Specimen:  Urine, Clean Catch Updated:  08/13/20 1855     LEGIONELLA ANTIGEN, URINE Negative    Respiratory Panel PCR w/COVID-19(SARS-CoV-2) [facility] In-House, NP Swab in UTM/VTM, 3-4 HR TAT - Swab, Nasopharynx [[id number]]  (Abnormal) Collected:   08/13/20 1253    Lab Status:  Final result Specimen:  Swab from Nasopharynx Updated:  08/13/20 1416     ADENOVIRUS, PCR Not Detected     Coronavirus 229E Not Detected     Coronavirus HKU1 Not Detected     Coronavirus NL63 Not Detected     Coronavirus OC43 Not Detected     COVID19 Detected     Human Metapneumovirus Not Detected     Human Rhinovirus/Enterovirus Not Detected     Influenza A PCR Not Detected     Influenza A H1 Not Detected     Influenza A H1 2009 PCR Not Detected     Influenza A H3 Not Detected     Influenza B PCR Not Detected     Parainfluenza Virus 1 Not Detected     Parainfluenza Virus 2 Not Detected     Parainfluenza Virus 3 Not Detected     Parainfluenza Virus 4 Not Detected     RSV, PCR Not Detected     Bordetella pertussis pcr Not Detected     Bordetella parapertussis PCR Not Detected     Chlamydophila pneumoniae PCR Not Detected     Mycoplasma pneumo by PCR Not Detected    Narrative:       Fact sheet for providers: https://docs.[employer]/wp-content/uploads/WQP5552-1229-LS8.1-EUA-Provider-Fact-Sheet-3.pdf    Fact sheet for patients: https://docs.[employer]/wp-content/uploads/ZXM2576-1198-GX2.1-EUA-Patient-Fact-Sheet-1.pdf          IMAGING & OTHER STUDIES    Imaging Results (Last 72 Hours)     Procedure Component Value Units Date/Time    CT Chest Pulmonary Embolism [[id number]] Collected:  08/13/20 0245     Updated:  08/13/20 0452    Narrative:       CT ANGIOGRAM OF THE CHEST    INDICATION: Cough, shortness of breath. Coronavirus positive.    TECHNIQUE: CT scan of the chest was performed following the administration of 100 mL Isovue-370 intravenous contrast. Imaging was performed to optimize evaluation of the pulmonary arterial system. CT dose lowering techniques were used, to include:   automated exposure control, adjustment for patient size, and / or use of iterative reconstruction. MIP images were also generated.    COMPARISON: None    FINDINGS:  Vasculature: There are no filling defects  in the central, lobar, proximal segmental or distal segmental pulmonary arteries. There is no evidence of right heart strain or pulmonary infarction. Coronary artery calcifications are present. There is fusiform   aneurysmal dilatation of the ascending aorta measuring 4.7 cm in diameter. The pulmonary trunk is also enlarged measuring 3.5 cm in diameter.    Mediastinum / Pleura: There is no pericardial or pleural effusion. Borderline right hilar lymph nodes are present.    Airways / Lungs: The central airways are patent.  There is no pneumothorax or consolidative parenchymal disease. Subsegmental atelectatic changes are present in both lung bases.    Bones: No destructive osseous lesion is identified.    Upper Abdomen: Limited images below the diaphragm demonstrate no acute abnormality. Nonobstructing stones are present in each kidney.      Impression:         1. No evidence of pulmonary embolism. No evidence of an acute intrathoracic process.  2. Borderline enlarged right hilar lymph nodes, nonspecific.  3. Fusiform ascending aortic aneurysm measuring 4.7 cm in diameter.  4. Enlarged pulmonary trunk. This can be seen in the setting of pulmonary arterial hypertension.  5. Coronary artery atherosclerosis.  6. Nonobstructing stones in each kidney.    Electronically signed by:  Davey Boo M.D.    8/13/2020 2:51 AM                ASSESSMENT/PLAN:     Acute respiratory failure with hypoxia and hypercapnia (CMS/HCC)    COVID-19    Ascending aortic aneurysm (CMS/HCC)    Essential hypertension    Lymphedema    Vitamin D deficiency    Polyneuropathy    Bipolar 1 disorder (CMS/HCC)    Shortness of breath    Obesity, morbid, BMI 50 or higher (CMS/HCC)      PLAN:  Reviewed CT scan of the chest films.    Reviewed ABG.    He likely has acute on chronic hypercapnic respiratory failure likely from his obesity hypoventilation syndrome.  We will continue with noninvasive ventilation.  BiPAP was ineffective so he was switched to  AVAPS settings.   His CT scan of the chest is concerning for pulmonary hypertension.    We will proceed with a 2D echo once he is more stable and hopefully after his COVID-19 testing is negative.  Repeat COVID-19 testing was positive  Diuresis as appropriate  Continue dexamethasone daily for total of 10 days  Not a candidate for Remdesivir due to length of infection  Continue scheduled diuresis  Continue Proventil and Symbicort  We will wean FiO2 as appropriate, currently on 15 L.  He is on Lovenox for DVT prophylaxis.  We will make further recommendations depending on his progress.      We will consult case management to assist with trilogy noninvasive ventilation at discharge.     THIS DOCUMENT HAS BEEN ELECTRONICALLY SIGNED BY  SUBHASH Cole  11:59 AM      Addendum  I have seen this patient independently and reviewed the medical records, labs and imaging and I agree with the note above as scribed for me by SUBHASH. I have corrected the note above for accuracy.    Inflammatory markers improving and significantly lower than anticipated given his oxygen requirement.  When I walked into the room the 15 L high flow cannula was not in his nose was actually on his forehead.  He was satting in the low 90s.  Throughout our discussion he remained 92% approximately.  Not even sure if he actually needs oxygen.  I did cut it down to 6 L for now.  Continue aggressively weaning him down.  CO2 retention is likely due to obesity hypoventilation syndrome.  I do agree he needs trilogy ventilation at discharge.  Lola Mtz MD

## 2020-08-15 NOTE — PLAN OF CARE
Problem: Patient Care Overview  Goal: Plan of Care Review  Outcome: Ongoing (interventions implemented as appropriate)  Flowsheets (Taken 8/15/2020 3202)  Progress: improving  Plan of Care Reviewed With: patient  Outcome Summary: Patient resting through the day, still on 15 liters high flow nasal cannula, vitals stable, SR BBB on the monitor, 2 assist turning in the bed, able to feed himself, CM consulted for trilogy at discharge, pulm still following, no plans for d/c yet, will continue to monitor

## 2020-08-15 NOTE — PLAN OF CARE
Problem: Patient Care Overview  Goal: Plan of Care Review  Outcome: Ongoing (interventions implemented as appropriate)  Flowsheets (Taken 8/15/2020 0630)  Outcome Summary: Pt resting well through the night but had desaturated to 70's. Pt placed on NPPV and sats remained stable. Pt able to help with some care but relies on nurse assist for adl's. Vitals stable at this time. Will continue to monitor.

## 2020-08-15 NOTE — PROGRESS NOTES
[facility] Medicine Services Daily Progress Note      Hospitalist Team  LOS 2 days      Patient Care Team:  Steve Valerio MD as PCP - General (Hospitalist)    Patient Location: 210/1      Subjective   Subjective     Chief Complaint / Subjective  Chief Complaint   Patient presents with   • Shortness of Breath         Brief Synopsis of Hospital Course/HPI  Mr. Ramirez is a 65 y.o. morbidly obese male who was sent to the [facility] ED on 08/13/2020 from [facility] complaining of shortness of breath. The patient is from [place] and recently tested positive for COVID-19. He was sent to [facility] as they have a unit dedicated to COVID positive patients. The patient was reportedly hypoxic overnight with O2 sat mid-80s on room air. He was placed on supplemental oxygen with improvement in his saturation. Upon arrival to the ER here the patient's O2 sat was 91% on 4 liters. His ABG revealed pH 7.308, CO2 81.1, pO2 86.8, HCO3 40.6. He was placed on BiPAP. His CBC and CMP were unremarkable. His proBNP and troponin were normal. His dimer was elevated at 1.19, but his CT chest was negative for pulmonary embolism. He was also noted have an ascending aortic aneurysm measuring 4.7 cm. His EKG showed sinus rhythm. He was admitted for further evaluation and treatment.      The patient is drowsy, but easy to arouse and oriented. He is having difficulty talking while on BiPAP. Nursing home records reviewed. The patient has a history of HTN, lymphedema, Bipolar disorder, polyneuropathy, and vitamin D deficiency. He is currently on metformin at the rehab facility, but denies a history of Type 2 DM or borderline DM. He states he was not previously on metformin. The patient is currently complaining of shortness of breath. He denies any other complaints.         8/14- Patient reports his breathing has improved.     8/15- Patient still on 15L high flow today.  "Discussed with pulmonology about getting trilogy for discharge.        Review of Systems   Cardiovascular: Negative for chest pain and palpitations.   Respiratory: Positive for cough and shortness of breath.          Objective   Objective      Vital Signs  Temp:  [96.1 °F (35.6 °C)-98.7 °F (37.1 °C)] 98.7 °F (37.1 °C)  Heart Rate:  [65-79] 73  Resp:  [17-20] 18  BP: (125-137)/(75-79) 137/79  Oxygen Therapy  SpO2: 93 %  Pulse Oximetry Type: Continuous  Device (Oxygen Therapy): nasal cannula  Device (Oxygen Therapy): room air  Flow (L/min): 0  Oxygen Concentration (%): 21(pt has on top of head)  Flowsheet Rows      First Filed Value   Admission Height  185.4 cm (73\") Documented at 08/13/2020 0307   Admission Weight  (!) 174 kg (384 lb) [Patient [facility] ] Documented at 08/13/2020 0307        Intake & Output (last 3 days)       08/13 0701 - 08/14 0700 08/14 0701 - 08/15 0700 08/15 0701 - 08/16 0700    P.O.  1200 720    Total Intake(mL/kg)  1200 (6.8) 720 (4.1)    Urine (mL/kg/hr) 400 (0.1) 1300 (0.3) 600 (0.3)    Total Output 400 1300 600    Net -400 -100 +120           Urine Unmeasured Occurrence 1 x 2 x         Lines, Drains & Airways    Active LDAs     Name:   Placement date:   Placement time:   Site:   Days:    Peripheral IV 08/13/20 0318 Left Antecubital   08/13/20 0318    Antecubital   1                  Physical Exam:    Physical Exam   Constitutional: He appears well-developed. No distress.   Morbidly obese male lying in bed in NAD    HENT:   Head: Normocephalic and atraumatic.   Mouth/Throat: Oropharynx is clear and moist.   Cardiovascular: Normal rate and regular rhythm.   Pulmonary/Chest: Effort normal. No respiratory distress.   Neurological: He is alert.   Skin: Skin is warm and dry. No rash noted.   Psychiatric: He has a normal mood and affect. His behavior is normal.   Vitals reviewed.           Wounds (last 24 hours)      LDA Wound     Row Name 08/15/20 1106             " Wound 08/13/20 0845 coccyx     Wound - Properties Group Date first assessed: 08/13/20  -AF Time first assessed: 0845 -AF Location: coccyx  -AF Primary Wound Type: --  -AF, small open spot, unable to take photo, zinc ordered, wocn consulted     Dressing Appearance  open to air  -MJ      Closure  Open to air  -MJ      Base  blanchable;pink;dry  -MJ      Periwound  intact;dry;blanchable  -MJ      Periwound Temperature  warm  -MJ      Drainage Amount  none  -MJ      Care, Wound  -- Mepilex and zinc cream added, no wound just red  -MJ      Dressing Care, Wound  other (see comments) Mepilex and zinc cream added, no wound just red  -MJ        User Key  (r) = Recorded By, (t) = Taken By, (c) = Cosigned By    Initials Name Provider Type    Yovana Young, RN Registered Nurse    Jahaira Dey RN Registered Nurse          Procedures:              Results Review:     I reviewed the patient's new clinical results.      Lab Results (last 24 hours)     Procedure Component Value Units Date/Time    POC Glucose Once [[id number]]  (Abnormal) Collected:  08/15/20 1646    Specimen:  Blood Updated:  08/15/20 1651     Glucose 182 mg/dL      Comment: Serial Number: [id number]:  [id number]       POC Glucose Once [[id number]]  (Abnormal) Collected:  08/15/20 1210    Specimen:  Blood Updated:  08/15/20 1212     Glucose 216 mg/dL      Comment: Serial Number: [id number]:  [id number]       BUN [[id number]]  (Normal) Collected:  08/15/20 0315    Specimen:  Blood Updated:  08/15/20 0724     BUN 23 mg/dL     Ferritin [[id number]]  (Normal) Collected:  08/15/20 0315    Specimen:  Blood Updated:  08/15/20 0456     Ferritin 82.30 ng/mL     Narrative:       Results may be falsely decreased if patient taking Biotin.      Magnesium [[id number]]  (Normal) Collected:  08/15/20 0315    Specimen:  Blood Updated:  08/15/20 0445     Magnesium 2.2 mg/dL     CK [[id number]]  (Normal) Collected:  08/15/20 0315    Specimen:  Blood Updated:   08/15/20 0445     Creatine Kinase 35 U/L     C-reactive Protein [[id number]]  (Normal) Collected:  08/15/20 0315    Specimen:  Blood Updated:  08/15/20 0445     C-Reactive Protein 0.21 mg/dL     Comprehensive Metabolic Panel [[id number]]  (Abnormal) Collected:  08/15/20 0315    Specimen:  Blood Updated:  08/15/20 0445     Glucose 119 mg/dL      BUN --     Comment: Testing performed by alternate method        Creatinine 0.71 mg/dL      Sodium 138 mmol/L      Potassium 4.4 mmol/L      Comment: Specimen hemolyzed.  Results may be affected.        Chloride 96 mmol/L      CO2 33.0 mmol/L      Calcium 8.7 mg/dL      Total Protein 6.3 g/dL      Albumin 3.50 g/dL      ALT (SGPT) 30 U/L      AST (SGOT) 30 U/L      Alkaline Phosphatase 37 U/L      Total Bilirubin 0.4 mg/dL      eGFR Non African Amer 112 mL/min/1.73      Globulin 2.8 gm/dL      A/G Ratio 1.3 g/dL      BUN/Creatinine Ratio --     Comment: Testing not performed        Anion Gap 9.0 mmol/L     Narrative:       GFR Normal >60  Chronic Kidney Disease <60  Kidney Failure <15      CBC & Differential [[id number]] Collected:  08/15/20 0315    Specimen:  Blood Updated:  08/15/20 0424    Narrative:       The following orders were created for panel order CBC & Differential.  Procedure                               Abnormality         Status                     ---------                               -----------         ------                     CBC Auto Differential[[id number]]        Abnormal            Final result                 Please view results for these tests on the individual orders.    CBC Auto Differential [[id number]]  (Abnormal) Collected:  08/15/20 0315    Specimen:  Blood Updated:  08/15/20 0424     WBC 7.70 10*3/mm3      RBC 4.74 10*6/mm3      Hemoglobin 14.5 g/dL      Hematocrit 44.3 %      MCV 93.4 fL      MCH 30.6 pg      MCHC 32.7 g/dL      RDW 13.7 %      RDW-SD 45.1 fl      MPV 9.3 fL      Platelets 207 10*3/mm3      Neutrophil % 76.3 %      Lymphocyte  % 15.2 %      Monocyte % 8.0 %      Eosinophil % 0.1 %      Basophil % 0.4 %      Neutrophils, Absolute 5.90 10*3/mm3      Lymphocytes, Absolute 1.20 10*3/mm3      Monocytes, Absolute 0.60 10*3/mm3      Eosinophils, Absolute 0.00 10*3/mm3      Basophils, Absolute 0.00 10*3/mm3      nRBC 0.1 /100 WBC     Blood Gas, Arterial [[id number]]  (Abnormal) Collected:  08/15/20 0347    Specimen:  Arterial Blood Updated:  08/15/20 0350     Site Left Radial     Janes's Test Positive     pH, Arterial 7.368 pH units      pCO2, Arterial 67.2 mm Hg      pO2, Arterial 97.2 mm Hg      HCO3, Arterial 38.7 mmol/L      Base Excess, Arterial 9.8 mmol/L      Comment: Serial Number: [id number]:  [id number]        O2 Saturation, Arterial 97.0 %      CO2 Content 40.8 mmol/L      Barometric Pressure for Blood Gas --     Comment: N/A        Modality BiPap     FIO2 60 %      Ventilator Mode ;NIV     PEEP 4     Hemodilution No     Respiratory Rate 24     Inspiratory Time 20    POC Glucose Once [[id number]]  (Abnormal) Collected:  08/15/20 0042    Specimen:  Blood Updated:  08/15/20 0042     Glucose 130 mg/dL      Comment: Serial Number: [id number]:  [id number]           Hemoglobin A1C   Date Value Ref Range Status   08/13/2020 5.6 3.5 - 5.6 % Final     Results from last 7 days   Lab Units 08/13/20  0318   INR  1.00       Results from last 7 days   Lab Units 08/15/20  0347   PH, ARTERIAL pH units 7.368   PO2 ART mm Hg 97.2   PCO2, ARTERIAL mm Hg 67.2*   HCO3 ART mmol/L 38.7*     No results found for: LIPASE  No results found for: CHOL, CHLPL, TRIG, HDL, LDL, LDLDIRECT    No results found for: INTRAOP, PREDX, FINALDX, COMDX    Microbiology Results (last 10 days)     Procedure Component Value - Date/Time    S. Pneumo Ag Urine or CSF - Urine, Urine, Clean Catch [[id number]]  (Normal) Collected:  08/13/20 1810    Lab Status:  Final result Specimen:  Urine, Clean Catch Updated:  08/13/20 1856     Strep Pneumo Ag Negative    Legionella Antigen, Urine  - Urine, Urine, Clean Catch [[id number]]  (Normal) Collected:  08/13/20 1810    Lab Status:  Final result Specimen:  Urine, Clean Catch Updated:  08/13/20 1855     LEGIONELLA ANTIGEN, URINE Negative    Respiratory Panel PCR w/COVID-19(SARS-CoV-2) IMMANUEL/JESSICA/JOSEMANUEL/PAD In-House, NP Swab in UTM/VTM, 3-4 HR TAT - Swab, Nasopharynx [[id number]]  (Abnormal) Collected:  08/13/20 1253    Lab Status:  Final result Specimen:  Swab from Nasopharynx Updated:  08/13/20 1416     ADENOVIRUS, PCR Not Detected     Coronavirus 229E Not Detected     Coronavirus HKU1 Not Detected     Coronavirus NL63 Not Detected     Coronavirus OC43 Not Detected     COVID19 Detected     Human Metapneumovirus Not Detected     Human Rhinovirus/Enterovirus Not Detected     Influenza A PCR Not Detected     Influenza A H1 Not Detected     Influenza A H1 2009 PCR Not Detected     Influenza A H3 Not Detected     Influenza B PCR Not Detected     Parainfluenza Virus 1 Not Detected     Parainfluenza Virus 2 Not Detected     Parainfluenza Virus 3 Not Detected     Parainfluenza Virus 4 Not Detected     RSV, PCR Not Detected     Bordetella pertussis pcr Not Detected     Bordetella parapertussis PCR Not Detected     Chlamydophila pneumoniae PCR Not Detected     Mycoplasma pneumo by PCR Not Detected    Narrative:       Fact sheet for providers: https://docs.[employer]/wp-content/uploads/HPG5159-1539-OV2.1-EUA-Provider-Fact-Sheet-3.pdf    Fact sheet for patients: https://docs.[employer]/wp-content/uploads/IDK9745-7065-AH7.1-EUA-Patient-Fact-Sheet-1.pdf          ECG/EMG Results (most recent)     Procedure Component Value Units Date/Time    ECG 12 Lead [[id number]] Collected:  08/13/20 0302     Updated:  08/14/20 1708    Narrative:       HEART RATE= 79  bpm  RR Interval= 760  ms  AZ Interval= 192  ms  P Horizontal Axis= 10  deg  P Front Axis= 42  deg  QRSD Interval= 126  ms  QT Interval= 384  ms  QRS Axis= -55  deg  T Wave Axis= 101  deg  - ABNORMAL ECG -  Sinus  rhythm  Nonspecific IVCD with LAD  Inferior infarct, old  Nonspecific T abnormalities, lateral leads  No previous ECG available for comparison  Electronically Signed By: Davi Hinojosa (Babatunde) 14-Aug-2020 16:57:21  Date and Time of Study: 2020-08-13 03:02:12                    Ct Chest Pulmonary Embolism    Result Date: 8/13/2020  1. No evidence of pulmonary embolism. No evidence of an acute intrathoracic process. 2. Borderline enlarged right hilar lymph nodes, nonspecific. 3. Fusiform ascending aortic aneurysm measuring 4.7 cm in diameter. 4. Enlarged pulmonary trunk. This can be seen in the setting of pulmonary arterial hypertension. 5. Coronary artery atherosclerosis. 6. Nonobstructing stones in each kidney. Electronically signed by:  Davey Boo M.D.  8/13/2020 2:51 AM          Xrays, labs reviewed personally by physician.    Medication Review:   I have reviewed the patient's current medication list      Scheduled Meds    albuterol sulfate HFA 2 puff Inhalation 4x Daily - RT   amLODIPine 10 mg Oral Daily   budesonide-formoterol 2 puff Inhalation BID - RT   celecoxib 100 mg Oral BID   dexamethasone 6 mg Intravenous Daily   divalproex 750 mg Oral TID   enoxaparin 40 mg Subcutaneous Q12H   furosemide 40 mg Intravenous Daily   furosemide 20 mg Oral Daily   gabapentin 600 mg Oral Q8H   insulin lispro 0-9 Units Subcutaneous Q6H   QUEtiapine 200 mg Oral BID   QUEtiapine  mg Oral Nightly   sodium chloride 10 mL Intravenous Q12H   zinc oxide  Topical Q12H       Meds Infusions    Pharmacy Consult - Steroid Insulin Protocol        Meds PRN  •  acetaminophen **OR** acetaminophen **OR** acetaminophen  •  albuterol sulfate HFA  •  aluminum-magnesium hydroxide-simethicone  •  bisacodyl  •  cyclobenzaprine  •  dextrose  •  dextrose  •  glucagon (human recombinant)  •  hydrALAZINE  •  insulin lispro **AND** insulin lispro  •  magnesium hydroxide  •  magnesium sulfate **OR** magnesium sulfate in D5W 1g/100mL (PREMIX)  •   melatonin  •  nitroglycerin  •  ondansetron **OR** ondansetron  •  Pharmacy Consult - Steroid Insulin Protocol  •  potassium chloride **OR** potassium chloride **OR** potassium chloride  •  sodium chloride        Assessment/Plan   Assessment/Plan     Active Hospital Problems    Diagnosis  POA   • **Acute respiratory failure with hypoxia and hypercapnia (CMS/HCC) [J96.01, J96.02]  Yes   • COVID-19 [U07.1]  Yes   • Ascending aortic aneurysm (CMS/[facility]) [I71.2]  Yes   • Essential hypertension [I10]  Yes   • Lymphedema [I89.0]  Yes   • Vitamin D deficiency [E55.9]  Yes   • Polyneuropathy [G62.9]  Yes   • Bipolar 1 disorder (CMS/[facility]) [F31.9]  Yes   • Shortness of breath [R06.02]  Yes   • Obesity, morbid, BMI 50 or higher (CMS/[facility]) [E66.01]  Yes      Resolved Hospital Problems   No resolved problems to display.       MEDICAL DECISION MAKING COMPLEXITY BY PROBLEM:       Acute hypoxic/hypercapnic respiratory failure  - CT scan reviewed and negative PE   - viral panel still + COVID 19 otherwise negative  - strep and legionella negative  - continue albuterol and symbicort  - continue dexamethasone   - discussed with pulmonology and will likely need trilogy at discharge   - wean supplemental oxygen as tolerated      COVID 19  - previously diagnosed  - continue supplemental oxygen and steroids  - not a candidate for Remdesivir     Ascending aortic aneurysm  - incidental finding on CT scan, measures 4.7 cm  - needs echo and referral to vascular surgery once COVID negative     Essential hypertension, chronic, controlled  - resume amlodipine, lisinopril and lasix    - monitor with routine VS and make adjustments as needed      Lymphedema  - resume po lasix      Vitamin D deficiency  - hold supplement      Polyneuropathy  - continue Celebrex, Flexeril and gabapentin     Bipolar 1 disorder   - resume Depakote and Seroquel  - stable     Morbid obesity   -  on dietary changes      DVT Prophylaxis  - enoxaparin         VTE  Prophylaxis -   Mechanical Order History:     None      Pharmalogical Order History:     Ordered     Dose Route Frequency Stop    08/14/20 1420  enoxaparin (LOVENOX) syringe 40 mg      40 mg SC Every 12 Hours --    08/13/20 0734  enoxaparin (LOVENOX) syringe 90 mg  Status:  Discontinued      0.5 mg/kg SC Every 12 Hours 08/14/20 1420            Code Status -   Code Status and Medical Interventions:   Ordered at: 08/13/20 0734     Code Status:    CPR     Medical Interventions (Level of Support Prior to Arrest):    Full       This patient has been examined wearing appropriate Personal Protective Equipment and discussed with pulmonologist. 08/15/20        Discharge Planning  Return to SNF once oxygen requirement decreased           Electronically signed by Bree Dan DO, 08/15/20, 19:37.  [facility] Hospitalist Team

## 2020-08-16 LAB
CK SERPL-CCNC: 29 U/L (ref 20–200)
CRP SERPL-MCNC: 0.13 MG/DL (ref 0–0.5)
D DIMER PPP FEU-MCNC: 0.32 MG/L (FEU) (ref 0–0.59)
FERRITIN SERPL-MCNC: 81.51 NG/ML (ref 30–400)
FIBRINOGEN PPP-MCNC: 254 MG/DL (ref 210–450)
GLUCOSE BLDC GLUCOMTR-MCNC: 114 MG/DL (ref 70–105)
GLUCOSE BLDC GLUCOMTR-MCNC: 135 MG/DL (ref 70–105)
GLUCOSE BLDC GLUCOMTR-MCNC: 170 MG/DL (ref 70–105)
GLUCOSE BLDC GLUCOMTR-MCNC: 172 MG/DL (ref 70–105)
GLUCOSE BLDC GLUCOMTR-MCNC: 183 MG/DL (ref 70–105)
LDH SERPL-CCNC: 148 U/L (ref 135–225)
MAGNESIUM SERPL-MCNC: 2.1 MG/DL (ref 1.6–2.4)
POTASSIUM SERPL-SCNC: 4.6 MMOL/L (ref 3.5–5.2)

## 2020-08-16 PROCEDURE — 83615 LACTATE (LD) (LDH) ENZYME: CPT | Performed by: NURSE PRACTITIONER

## 2020-08-16 PROCEDURE — 25010000002 ENOXAPARIN PER 10 MG: Performed by: INTERNAL MEDICINE

## 2020-08-16 PROCEDURE — 94799 UNLISTED PULMONARY SVC/PX: CPT

## 2020-08-16 PROCEDURE — 82728 ASSAY OF FERRITIN: CPT | Performed by: NURSE PRACTITIONER

## 2020-08-16 PROCEDURE — 99231 SBSQ HOSP IP/OBS SF/LOW 25: CPT | Performed by: INTERNAL MEDICINE

## 2020-08-16 PROCEDURE — 82550 ASSAY OF CK (CPK): CPT | Performed by: NURSE PRACTITIONER

## 2020-08-16 PROCEDURE — 94660 CPAP INITIATION&MGMT: CPT

## 2020-08-16 PROCEDURE — 63710000001 INSULIN LISPRO (HUMAN) PER 5 UNITS: Performed by: NURSE PRACTITIONER

## 2020-08-16 PROCEDURE — 85379 FIBRIN DEGRADATION QUANT: CPT | Performed by: NURSE PRACTITIONER

## 2020-08-16 PROCEDURE — 83735 ASSAY OF MAGNESIUM: CPT | Performed by: NURSE PRACTITIONER

## 2020-08-16 PROCEDURE — 84132 ASSAY OF SERUM POTASSIUM: CPT | Performed by: NURSE PRACTITIONER

## 2020-08-16 PROCEDURE — 85384 FIBRINOGEN ACTIVITY: CPT | Performed by: NURSE PRACTITIONER

## 2020-08-16 PROCEDURE — 25010000002 DEXAMETHASONE SODIUM PHOSPHATE 10 MG/ML SOLUTION: Performed by: NURSE PRACTITIONER

## 2020-08-16 PROCEDURE — 82962 GLUCOSE BLOOD TEST: CPT

## 2020-08-16 PROCEDURE — 86140 C-REACTIVE PROTEIN: CPT | Performed by: NURSE PRACTITIONER

## 2020-08-16 RX ADMIN — GABAPENTIN 600 MG: 300 CAPSULE ORAL at 18:14

## 2020-08-16 RX ADMIN — INSULIN LISPRO 2 UNITS: 100 INJECTION, SOLUTION INTRAVENOUS; SUBCUTANEOUS at 12:54

## 2020-08-16 RX ADMIN — ENOXAPARIN SODIUM 40 MG: 40 INJECTION SUBCUTANEOUS at 10:42

## 2020-08-16 RX ADMIN — DIVALPROEX SODIUM 750 MG: 250 TABLET, DELAYED RELEASE ORAL at 18:14

## 2020-08-16 RX ADMIN — QUETIAPINE FUMARATE 200 MG: 50 TABLET, EXTENDED RELEASE ORAL at 21:32

## 2020-08-16 RX ADMIN — DIVALPROEX SODIUM 750 MG: 250 TABLET, DELAYED RELEASE ORAL at 21:31

## 2020-08-16 RX ADMIN — ALBUTEROL SULFATE 2 PUFF: 90 AEROSOL, METERED RESPIRATORY (INHALATION) at 11:30

## 2020-08-16 RX ADMIN — DEXAMETHASONE SODIUM PHOSPHATE 6 MG: 10 INJECTION, SOLUTION INTRAMUSCULAR; INTRAVENOUS at 10:41

## 2020-08-16 RX ADMIN — ALBUTEROL SULFATE 2 PUFF: 90 AEROSOL, METERED RESPIRATORY (INHALATION) at 07:02

## 2020-08-16 RX ADMIN — BUDESONIDE AND FORMOTEROL FUMARATE DIHYDRATE 2 PUFF: 160; 4.5 AEROSOL RESPIRATORY (INHALATION) at 07:02

## 2020-08-16 RX ADMIN — ZINC OXIDE: 200 OINTMENT TOPICAL at 10:43

## 2020-08-16 RX ADMIN — INSULIN LISPRO 2 UNITS: 100 INJECTION, SOLUTION INTRAVENOUS; SUBCUTANEOUS at 18:13

## 2020-08-16 RX ADMIN — QUETIAPINE 200 MG: 100 TABLET, FILM COATED ORAL at 21:31

## 2020-08-16 RX ADMIN — ALUMINUM HYDROXIDE, MAGNESIUM HYDROXIDE, AND DIMETHICONE 15 ML: 400; 400; 40 SUSPENSION ORAL at 18:14

## 2020-08-16 RX ADMIN — GABAPENTIN 600 MG: 300 CAPSULE ORAL at 21:31

## 2020-08-16 RX ADMIN — CELECOXIB 100 MG: 100 CAPSULE ORAL at 21:31

## 2020-08-16 RX ADMIN — GABAPENTIN 600 MG: 300 CAPSULE ORAL at 07:10

## 2020-08-16 RX ADMIN — ALBUTEROL SULFATE 2 PUFF: 90 AEROSOL, METERED RESPIRATORY (INHALATION) at 15:21

## 2020-08-16 RX ADMIN — CELECOXIB 100 MG: 100 CAPSULE ORAL at 10:41

## 2020-08-16 RX ADMIN — DIVALPROEX SODIUM 750 MG: 250 TABLET, DELAYED RELEASE ORAL at 10:42

## 2020-08-16 RX ADMIN — Medication 10 ML: at 10:43

## 2020-08-16 RX ADMIN — ALBUTEROL SULFATE 2 PUFF: 90 AEROSOL, METERED RESPIRATORY (INHALATION) at 19:18

## 2020-08-16 RX ADMIN — QUETIAPINE 200 MG: 100 TABLET, FILM COATED ORAL at 10:41

## 2020-08-16 RX ADMIN — LISINOPRIL 40 MG: 20 TABLET ORAL at 21:31

## 2020-08-16 RX ADMIN — BUDESONIDE AND FORMOTEROL FUMARATE DIHYDRATE 2 PUFF: 160; 4.5 AEROSOL RESPIRATORY (INHALATION) at 19:18

## 2020-08-16 RX ADMIN — FUROSEMIDE 20 MG: 20 TABLET ORAL at 10:42

## 2020-08-16 RX ADMIN — ZINC OXIDE: 200 OINTMENT TOPICAL at 21:31

## 2020-08-16 RX ADMIN — ENOXAPARIN SODIUM 40 MG: 40 INJECTION SUBCUTANEOUS at 21:31

## 2020-08-16 RX ADMIN — AMLODIPINE BESYLATE 10 MG: 5 TABLET ORAL at 10:41

## 2020-08-16 RX ADMIN — Medication 10 ML: at 21:31

## 2020-08-16 NOTE — PROGRESS NOTES
[facility] Daily Progress Note      Hospitalist Team  LOS 3 days      Patient Care Team:  Steve Valerio MD as PCP - General (Hospitalist)    Patient Location: 210/1      Subjective   Subjective     Chief Complaint / Subjective  Chief Complaint   Patient presents with   • Shortness of Breath         Brief Synopsis of Hospital Course/HPI  Mr. Ramirez is a 65 y.o. morbidly obese male who was sent to the [facility] ED on 08/13/2020 from [facility] complaining of shortness of breath. The patient is from [place] and recently tested positive for COVID-19. He was sent to [facility] as they have a unit dedicated to COVID positive patients. The patient was reportedly hypoxic overnight with O2 sat mid-80s on room air. He was placed on supplemental oxygen with improvement in his saturation. Upon arrival to the ER here the patient's O2 sat was 91% on 4 liters. His ABG revealed pH 7.308, CO2 81.1, pO2 86.8, HCO3 40.6. He was placed on BiPAP. His CBC and CMP were unremarkable. His proBNP and troponin were normal. His dimer was elevated at 1.19, but his CT chest was negative for pulmonary embolism. He was also noted have an ascending aortic aneurysm measuring 4.7 cm. His EKG showed sinus rhythm. He was admitted for further evaluation and treatment.      The patient is drowsy, but easy to arouse and oriented. He is having difficulty talking while on BiPAP. Nursing home records reviewed. The patient has a history of HTN, lymphedema, Bipolar disorder, polyneuropathy, and vitamin D deficiency. He is currently on metformin at the rehab facility, but denies a history of Type 2 DM or borderline DM. He states he was not previously on metformin. The patient is currently complaining of shortness of breath. He denies any other complaints.         8/14- Patient reports his breathing has improved.     8/15- Patient still on 15L high flow today.  "Discussed with pulmonology about getting trilogy for discharge.    8/16- Weaned oxygen down to 8L high flow. Continue to titrate down as tolerated. PT denies any complaints    Review of Systems   Cardiovascular: Negative for chest pain and palpitations.   Respiratory: Negative for cough and shortness of breath.          Objective   Objective      Vital Signs  Temp:  [96.6 °F (35.9 °C)-98.6 °F (37 °C)] 98.6 °F (37 °C)  Heart Rate:  [57-78] 78  Resp:  [16-20] 18  BP: (110-122)/(66-78) 122/66  Oxygen Therapy  SpO2: 96 %  Pulse Oximetry Type: Continuous  Device (Oxygen Therapy): nasal cannula  Device (Oxygen Therapy): high-flow nasal cannula  Flow (L/min): 6  Oxygen Concentration (%): 60  Flowsheet Rows      First Filed Value   Admission Height  185.4 cm (73\") Documented at 08/13/2020 0307   Admission Weight  (!) 174 kg (384 lb) [Patient states weight this morning at [facility] ] Documented at 08/13/2020 0307        Intake & Output (last 3 days)       08/14 0701 - 08/15 0700 08/15 0701 - 08/16 0700 08/16 0701 - 08/17 0700    P.O. 1200 1560 600    Total Intake(mL/kg) 1200 (6.8) 1560 (8.8) 600 (3.4)    Urine (mL/kg/hr) 1300 (0.3) 600 (0.1) 150 (0.1)    Total Output 1300 600 150    Net -100 +960 +450           Urine Unmeasured Occurrence 2 x          Lines, Drains & Airways    Active LDAs     Name:   Placement date:   Placement time:   Site:   Days:    Peripheral IV 08/13/20 0318 Left Antecubital   08/13/20 0318    Antecubital   1                  Physical Exam:    Physical Exam   Constitutional: He appears well-developed. No distress.   Morbidly obese male lying in bed in NAD    HENT:   Head: Normocephalic and atraumatic.   Mouth/Throat: Oropharynx is clear and moist.   Cardiovascular: Normal rate and regular rhythm.   Pulmonary/Chest: Effort normal. No respiratory distress.   Neurological: He is alert.   Skin: Skin is warm and dry. No rash noted.   Psychiatric: He has a normal mood and affect. His behavior is normal.   "   Vitals reviewed.    Unchanged from 8/15/2020       Wounds (last 24 hours)      LDA Wound     Row Name 08/16/20 1100 08/16/20 0700 08/16/20 0300       Wound 08/13/20 0845 coccyx     Wound - Properties Group Date first assessed: 08/13/20 -AF Time first assessed: 0845 -AF Location: coccyx  -AF Primary Wound Type: --  -AF, small open spot, unable to take photo, zinc ordered, wocn consulted     Dressing Appearance  open to air  -NE  open to air  -NE  open to air  -MS    Closure  Open to air  -NE  Open to air  -NE  Open to air  -MS    Base  blanchable;pink;dry  -NE  blanchable;pink;dry  -NE  blanchable;pink;dry  -MS    Periwound  intact;dry;blanchable  -NE  intact;dry;blanchable  -NE  intact;dry;blanchable  -MS    Periwound Temperature  warm  -NE  warm  -NE  warm  -MS    Drainage Amount  none  -NE  none  -NE  none  -MS    Row Name 08/15/20 2300             Wound 08/13/20 0845 coccyx     Wound - Properties Group Date first assessed: 08/13/20 -AF Time first assessed: 0845 -AF Location: coccyx  -AF Primary Wound Type: --  -AF, small open spot, unable to take photo, zinc ordered, wocn consulted     Dressing Appearance  open to air  -MS      Closure  Open to air  -MS      Base  blanchable;pink;dry  -MS      Periwound  intact;dry;blanchable  -MS      Periwound Temperature  warm  -MS      Drainage Amount  none  -MS        User Key  (r) = Recorded By, (t) = Taken By, (c) = Cosigned By    Initials Name Provider Type    AF Yovana Awan, RN Registered Nurse    Tricia Farmer RN Registered Nurse    Derrek Monsalve, RN Registered Nurse          Procedures:              Results Review:     I reviewed the patient's new clinical results.      Lab Results (last 24 hours)     Procedure Component Value Units Date/Time    POC Glucose Once [[id number]]  (Abnormal) Collected:  08/16/20 1640    Specimen:  Blood Updated:  08/16/20 1727     Glucose 183 mg/dL      Comment: Serial Number: [id number]:  [id number]       POC  Glucose Once [[id number]]  (Abnormal) Collected:  08/16/20 1104    Specimen:  Blood Updated:  08/16/20 1106     Glucose 170 mg/dL      Comment: Serial Number: [id number]:  [id number]       POC Glucose Once [[id number]]  (Abnormal) Collected:  08/16/20 0715    Specimen:  Blood Updated:  08/16/20 0719     Glucose 114 mg/dL      Comment: Serial Number: [id number]:  [id number]       Ferritin [[id number]]  (Normal) Collected:  08/16/20 0524    Specimen:  Blood Updated:  08/16/20 0606     Ferritin 81.51 ng/mL     Narrative:       Results may be falsely decreased if patient taking Biotin.      Potassium [[id number]]  (Normal) Collected:  08/16/20 0524    Specimen:  Blood Updated:  08/16/20 0604     Potassium 4.6 mmol/L     Magnesium [[id number]]  (Normal) Collected:  08/16/20 0524    Specimen:  Blood Updated:  08/16/20 0604     Magnesium 2.1 mg/dL     CK [[id number]]  (Normal) Collected:  08/16/20 0524    Specimen:  Blood Updated:  08/16/20 0604     Creatine Kinase 29 U/L     C-reactive Protein [[id number]]  (Normal) Collected:  08/16/20 0524    Specimen:  Blood Updated:  08/16/20 0604     C-Reactive Protein 0.13 mg/dL     Lactate Dehydrogenase [[id number]]  (Normal) Collected:  08/16/20 0524    Specimen:  Blood Updated:  08/16/20 0604      U/L     D-dimer, Quantitative [[id number]]  (Normal) Collected:  08/16/20 0524    Specimen:  Blood Updated:  08/16/20 0550     D-Dimer, Quantitative 0.32 mg/L (FEU)     Narrative:       Reference Range  --------------------------------------------------------------------     < 0.50   Negative Predictive Value  0.50-0.59   Indeterminate    >= 0.60   Probable VTE             A very low percentage of patients with DVT may yield D-Dimer results   below the cut-off of 0.50 mg/L FEU.  This is known to be more   prevalent in patients with distal DVT.             Results of this test should always be interpreted in conjunction with   the patient's medical history, clinical  presentation and other   findings.  Clinical diagnosis should not be based on the result of   INNOVANCE D-Dimer alone.    Fibrinogen [[id number]]  (Normal) Collected:  08/16/20 0524    Specimen:  Blood Updated:  08/16/20 0550     Fibrinogen 254 mg/dL     POC Glucose Once [[id number]]  (Abnormal) Collected:  08/16/20 0018    Specimen:  Blood Updated:  08/16/20 0034     Glucose 135 mg/dL      Comment: Serial Number: [id number]:  [id number]           No results found for: HGBA1C  Results from last 7 days   Lab Units 08/13/20  0318   INR  1.00       Results from last 7 days   Lab Units 08/15/20  0347   PH, ARTERIAL pH units 7.368   PO2 ART mm Hg 97.2   PCO2, ARTERIAL mm Hg 67.2*   HCO3 ART mmol/L 38.7*     No results found for: LIPASE  No results found for: CHOL, CHLPL, TRIG, HDL, LDL, LDLDIRECT    No results found for: INTRAOP, PREDX, FINALDX, COMDX    Microbiology Results (last 10 days)     Procedure Component Value - Date/Time    S. Pneumo Ag Urine or CSF - Urine, Urine, Clean Catch [[id number]]  (Normal) Collected:  08/13/20 1810    Lab Status:  Final result Specimen:  Urine, Clean Catch Updated:  08/13/20 1856     Strep Pneumo Ag Negative    Legionella Antigen, Urine - Urine, Urine, Clean Catch [[id number]]  (Normal) Collected:  08/13/20 1810    Lab Status:  Final result Specimen:  Urine, Clean Catch Updated:  08/13/20 1855     LEGIONELLA ANTIGEN, URINE Negative    Respiratory Panel PCR w/COVID-19(SARS-CoV-2) IMMANUEL/JESSICA/JOSEMANUEL/PAD In-House, NP Swab in UTM/VTM, 3-4 HR TAT - Swab, Nasopharynx [[id number]]  (Abnormal) Collected:  08/13/20 1253    Lab Status:  Final result Specimen:  Swab from Nasopharynx Updated:  08/13/20 1416     ADENOVIRUS, PCR Not Detected     Coronavirus 229E Not Detected     Coronavirus HKU1 Not Detected     Coronavirus NL63 Not Detected     Coronavirus OC43 Not Detected     COVID19 Detected     Human Metapneumovirus Not Detected     Human Rhinovirus/Enterovirus Not Detected     Influenza A PCR Not  Detected     Influenza A H1 Not Detected     Influenza A H1 2009 PCR Not Detected     Influenza A H3 Not Detected     Influenza B PCR Not Detected     Parainfluenza Virus 1 Not Detected     Parainfluenza Virus 2 Not Detected     Parainfluenza Virus 3 Not Detected     Parainfluenza Virus 4 Not Detected     RSV, PCR Not Detected     Bordetella pertussis pcr Not Detected     Bordetella parapertussis PCR Not Detected     Chlamydophila pneumoniae PCR Not Detected     Mycoplasma pneumo by PCR Not Detected    Narrative:       Fact sheet for providers: https://docs.[facility] [employer] We [facility]/wp-content/uploads/RUD3149-5577-SA3.1-EUA-Provider-Fact-Sheet-3.pdf    Fact sheet for patients: https://docs.Health [facility] We [facility]/wp-content/uploads/HEB2325-4085-SZ1.1-EUA-Patient-Fact-Sheet-1.pdf          ECG/EMG Results (most recent)     Procedure Component Value Units Date/Time    ECG 12 Lead [[id number]] Collected:  08/13/20 0302     Updated:  08/14/20 1708    Narrative:       HEART RATE= 79  bpm  RR Interval= 760  ms  PA Interval= 192  ms  P Horizontal Axis= 10  deg  P Front Axis= 42  deg  QRSD Interval= 126  ms  QT Interval= 384  ms  QRS Axis= -55  deg  T Wave Axis= 101  deg  - ABNORMAL ECG -  Sinus rhythm  Nonspecific IVCD with LAD  Inferior infarct, old  Nonspecific T abnormalities, lateral leads  No previous ECG available for comparison  Electronically Signed By: Davi Hinojosa (Babatunde) 14-Aug-2020 16:57:21  Date and Time of Study: 2020-08-13 03:02:12                    Ct Chest Pulmonary Embolism    Result Date: 8/13/2020  1. No evidence of pulmonary embolism. No evidence of an acute intrathoracic process. 2. Borderline enlarged right hilar lymph nodes, nonspecific. 3. Fusiform ascending aortic aneurysm measuring 4.7 cm in diameter. 4. Enlarged pulmonary trunk. This can be seen in the setting of pulmonary arterial hypertension. 5. Coronary artery atherosclerosis. 6. Nonobstructing stones in each kidney. Electronically signed by:  Davey Boo M.D.   8/13/2020 2:51 AM          Xrays, labs reviewed personally by physician.    Medication Review:   I have reviewed the patient's current medication list      Scheduled Meds    albuterol sulfate HFA 2 puff Inhalation 4x Daily - RT   amLODIPine 10 mg Oral Daily   budesonide-formoterol 2 puff Inhalation BID - RT   celecoxib 100 mg Oral BID   dexamethasone 6 mg Intravenous Daily   divalproex 750 mg Oral TID   enoxaparin 40 mg Subcutaneous Q12H   furosemide 20 mg Oral Daily   gabapentin 600 mg Oral Q8H   insulin lispro 0-9 Units Subcutaneous Q6H   lisinopril 40 mg Oral Q24H   QUEtiapine 200 mg Oral BID   QUEtiapine  mg Oral Nightly   sodium chloride 10 mL Intravenous Q12H   zinc oxide  Topical Q12H       Meds Infusions    Pharmacy Consult - Steroid Insulin Protocol        Meds PRN  •  acetaminophen **OR** acetaminophen **OR** acetaminophen  •  albuterol sulfate HFA  •  aluminum-magnesium hydroxide-simethicone  •  bisacodyl  •  cyclobenzaprine  •  dextrose  •  dextrose  •  glucagon (human recombinant)  •  hydrALAZINE  •  insulin lispro **AND** insulin lispro  •  magnesium hydroxide  •  magnesium sulfate **OR** magnesium sulfate in D5W 1g/100mL (PREMIX)  •  melatonin  •  nitroglycerin  •  ondansetron **OR** ondansetron  •  Pharmacy Consult - Steroid Insulin Protocol  •  potassium chloride **OR** potassium chloride **OR** potassium chloride  •  sodium chloride        Assessment/Plan   Assessment/Plan     Active Hospital Problems    Diagnosis  POA   • **Acute respiratory failure with hypoxia and hypercapnia (CMS/HCC) [J96.01, J96.02]  Yes   • COVID-19 [U07.1]  Yes   • Ascending aortic aneurysm (CMS/HCC) [I71.2]  Yes   • Essential hypertension [I10]  Yes   • Lymphedema [I89.0]  Yes   • Vitamin D deficiency [E55.9]  Yes   • Polyneuropathy [G62.9]  Yes   • Bipolar 1 disorder (CMS/HCC) [F31.9]  Yes   • Shortness of breath [R06.02]  Yes   • Obesity, morbid, BMI 50 or higher (CMS/HCC) [E66.01]  Yes      Resolved Hospital  Problems   No resolved problems to display.       MEDICAL DECISION MAKING COMPLEXITY BY PROBLEM:       Acute hypoxic/hypercapnic respiratory failure  - CT scan reviewed and negative PE   - viral panel still + COVID 19 otherwise negative  - strep and legionella negative  - continue albuterol and symbicort  - continue dexamethasone   - discussed with pulmonology and will likely need trilogy at discharge   - wean supplemental oxygen as tolerated       COVID 19  - previously diagnosed   - continue supplemental oxygen and steroids  - not a candidate for Remdesivir     Ascending aortic aneurysm  - incidental finding on CT scan, measures 4.7 cm  - needs echo and referral to vascular surgery once COVID negative     Essential hypertension, chronic, controlled  - resume amlodipine, lisinopril and lasix    - monitor with routine VS and make adjustments as needed      Lymphedema  - resume po lasix      Vitamin D deficiency  - hold supplement      Polyneuropathy  - continue Celebrex, Flexeril and gabapentin     Bipolar 1 disorder   - resume Depakote and Seroquel  - stable     Morbid obesity   -  on dietary changes      DVT Prophylaxis  - enoxaparin         VTE Prophylaxis -   Mechanical Order History:     None      Pharmalogical Order History:     Ordered     Dose Route Frequency Stop    08/14/20 1420  enoxaparin (LOVENOX) syringe 40 mg      40 mg SC Every 12 Hours --    08/13/20 0734  enoxaparin (LOVENOX) syringe 90 mg  Status:  Discontinued      0.5 mg/kg SC Every 12 Hours 08/14/20 1420            Code Status -   Code Status and Medical Interventions:   Ordered at: 08/13/20 0734     Code Status:    CPR     Medical Interventions (Level of Support Prior to Arrest):    Full       This patient has been examined wearing appropriate Personal Protective Equipment and discussed with pulmonologist. 08/16/20        Discharge Planning  Return to SNF once oxygen requirement decreased and Trilogy arranged         Electronically  signed by Bree Dan DO, 08/16/20, 19:26.  [facility] Hospitalist Team

## 2020-08-16 NOTE — PLAN OF CARE
Pt. Vitals have been stable. Pt. To discharge to [facility]. once they have a room Will continue to monitor vitals.

## 2020-08-16 NOTE — PROGRESS NOTES
KPA/PULM/CC PROGRESS NOTE    65 y.o. male who was referred for consultation for shortness of breath and respiratory failure.  Patient is a morbidly obese male with remote history of tobacco use but he denies any previous history of COPD or emphysema.  Patient was recently diagnosed with COVID-19 infection and in [place] a few weeks ago.  He subsequently was transferred to a local nursing home.  Patient was noted to have some low O2 sats yesterday.  He was placed on 3 L nasal cannula and was subsequently transferred to the ER for further evaluation.  Patient was evaluated in the ER.  He required BiPAP therapy and was admitted to the hospital.  I have been asked to see him for further evaluation.  At time of my evaluation patient awakens easily.  He does complain of some shortness of breath.  He does complain of some cough.  He does complain of some nausea and diarrhea.  He denies any chest pain or palpitations.  He denies any vomiting.  His shortness of breath gets worse with any activity.  It gets better with resting and use of supplemental oxygen   SUBJECTIVE    8/14: Awake.  Remains on noninvasive ventilation.  More awake and appropriate today.  Breathing some better.  No complaints of chest pain.  No nausea or vomiting  8/15: Currently tolerating 15 L high flow.  Afebrile overnight.  Blood pressure stable  8/16: No acute events overnight.  Currently on 8 L high flow cannula.  No nausea vomiting.  Afebrile.      OBJECTIVE    Vitals:    08/16/20 0823 08/16/20 1104 08/16/20 1130 08/16/20 1521   BP: 113/78 121/74     BP Location: Right arm Right arm     Patient Position: Lying Lying     Pulse: 57 70     Resp: 16 16     Temp: 96.6 °F (35.9 °C) 97.7 °F (36.5 °C)     TempSrc: Oral Infrared     SpO2: 92% 100% 100% 94%   Weight:       Height:          Intake/Output last 3 shifts:  I/O last 3 completed shifts:  In: 2040 [P.O.:2040]  Out: 600 [Urine:600]  Intake/Output this shift:  I/O this shift:  In: 360  [P.O.:360]  Out: -     Constitutional: Morbidly obese, chronically ill-appearing, no acute distress, non-toxic appearance   Eyes:   conjunctiva normal   HENT:  Atraumatic, external ears normal, nose normal  Neck- normal range of motion, no tenderness, supple   Respiratory:  No respiratory distress, normal breath sounds, no rales, no wheezing   Cardiovascular:  Normal rate, normal rhythm, no murmurs, no gallops, no rubs   GI:  Soft, nondistended, normal bowel sounds, nontender, no rebound, no guarding   Musculoskeletal:  + edema, no tenderness, no deformities.  Integument:  Well hydrated, no rash   Neurologic: Awake, CN 2-12 normal, normal motor function, normal sensory function, no focal deficits noted   Psychiatric:  Speech and behavior appropriate     Scheduled Meds:    albuterol sulfate HFA 2 puff Inhalation 4x Daily - RT   amLODIPine 10 mg Oral Daily   budesonide-formoterol 2 puff Inhalation BID - RT   celecoxib 100 mg Oral BID   dexamethasone 6 mg Intravenous Daily   divalproex 750 mg Oral TID   enoxaparin 40 mg Subcutaneous Q12H   furosemide 20 mg Oral Daily   gabapentin 600 mg Oral Q8H   insulin lispro 0-9 Units Subcutaneous Q6H   lisinopril 40 mg Oral Q24H   QUEtiapine 200 mg Oral BID   QUEtiapine  mg Oral Nightly   sodium chloride 10 mL Intravenous Q12H   zinc oxide  Topical Q12H       Continuous Infusions:    Pharmacy Consult - Steroid Insulin Protocol        PRN Meds:•  acetaminophen **OR** acetaminophen **OR** acetaminophen  •  albuterol sulfate HFA  •  aluminum-magnesium hydroxide-simethicone  •  bisacodyl  •  cyclobenzaprine  •  dextrose  •  dextrose  •  glucagon (human recombinant)  •  hydrALAZINE  •  insulin lispro **AND** insulin lispro  •  magnesium hydroxide  •  magnesium sulfate **OR** magnesium sulfate in D5W 1g/100mL (PREMIX)  •  melatonin  •  nitroglycerin  •  ondansetron **OR** ondansetron  •  Pharmacy Consult - Steroid Insulin Protocol  •  potassium chloride **OR** potassium chloride  **OR** potassium chloride  •  sodium chloride     LABS    CBC  Results from last 7 days   Lab Units 08/15/20  0315 08/14/20  0716 08/13/20  0318   WBC 10*3/mm3 7.70 8.30 5.70   RBC 10*6/mm3 4.74 5.15 5.12   HEMOGLOBIN g/dL 14.5 15.4 15.4   HEMATOCRIT % 44.3 47.3 47.5   MCV fL 93.4 91.7 92.7   PLATELETS 10*3/mm3 207 115* 226       CMP   Results from last 7 days   Lab Units 08/16/20  0524 08/15/20  0315 08/14/20  0716 08/13/20 0318   SODIUM mmol/L  --  138 138 141   POTASSIUM mmol/L 4.6 4.4 4.4 4.6   CHLORIDE mmol/L  --  96* 97* 99   CO2 mmol/L  --  33.0* 34.0* 36.0*   BUN   --  23 22 17   CREATININE mg/dL  --  0.71* 0.71* 0.78   GLUCOSE mg/dL  --  119* 106* 97   ALBUMIN g/dL  --  3.50 3.50 3.50   BILIRUBIN mg/dL  --  0.4 0.5 0.4   ALK PHOS U/L  --  37* 42 40   AST (SGOT) U/L  --  30 26 19   ALT (SGPT) U/L  --  30 26 21       TROPONIN  Results from last 7 days   Lab Units 08/16/20  0524  08/13/20 0318   CK TOTAL U/L 29   < > 45   TROPONIN T ng/mL  --   --  <0.010    < > = values in this interval not displayed.       CoAg  Results from last 7 days   Lab Units 08/13/20 0318   INR  1.00   APTT seconds 29.3       ABG  Results from last 7 days   Lab Units 08/15/20  0347 08/14/20  1140 08/13/20  0815 08/13/20  0343   PH, ARTERIAL pH units 7.368 7.369 7.285* 7.308*   PCO2, ARTERIAL mm Hg 67.2* 70.5* 82.7* 81.1*   PO2 ART mm Hg 97.2 87.2 94.8 86.8   O2 SATURATION ART % 97.0 95.8 95.7 94.8   BASE EXCESS ART mmol/L 9.8* 10.8* 8.1* 9.6*       Microbiology  Microbiology Results (last 10 days)     Procedure Component Value - Date/Time    S. Pneumo Ag Urine or CSF - Urine, Urine, Clean Catch [[id number]]  (Normal) Collected:  08/13/20 1810    Lab Status:  Final result Specimen:  Urine, Clean Catch Updated:  08/13/20 1856     Strep Pneumo Ag Negative    Legionella Antigen, Urine - Urine, Urine, Clean Catch [[id number]]  (Normal) Collected:  08/13/20 1810    Lab Status:  Final result Specimen:  Urine, Clean Catch Updated:  08/13/20  1855     LEGIONELLA ANTIGEN, URINE Negative    Respiratory Panel PCR w/COVID-19(SARS-CoV-2) IMMANUEL/JESSICA/JOSEMANUEL/PAD In-House, NP Swab in UTM/VTM, 3-4 HR TAT - Swab, Nasopharynx [[id number]]  (Abnormal) Collected:  08/13/20 1253    Lab Status:  Final result Specimen:  Swab from Nasopharynx Updated:  08/13/20 1416     ADENOVIRUS, PCR Not Detected     Coronavirus 229E Not Detected     Coronavirus HKU1 Not Detected     Coronavirus NL63 Not Detected     Coronavirus OC43 Not Detected     COVID19 Detected     Human Metapneumovirus Not Detected     Human Rhinovirus/Enterovirus Not Detected     Influenza A PCR Not Detected     Influenza A H1 Not Detected     Influenza A H1 2009 PCR Not Detected     Influenza A H3 Not Detected     Influenza B PCR Not Detected     Parainfluenza Virus 1 Not Detected     Parainfluenza Virus 2 Not Detected     Parainfluenza Virus 3 Not Detected     Parainfluenza Virus 4 Not Detected     RSV, PCR Not Detected     Bordetella pertussis pcr Not Detected     Bordetella parapertussis PCR Not Detected     Chlamydophila pneumoniae PCR Not Detected     Mycoplasma pneumo by PCR Not Detected    Narrative:       Fact sheet for providers: https://docs.[employer]/wp-content/uploads/QJW7903-7483-MB2.1-EUA-Provider-Fact-Sheet-3.pdf    Fact sheet for patients: https://docs.[employer]/wp-content/uploads/SQE9587-0371-TJ1.1-EUA-Patient-Fact-Sheet-1.pdf          IMAGING & OTHER STUDIES    Imaging Results (Last 72 Hours)     ** No results found for the last 72 hours. **                ASSESSMENT/PLAN:     Acute respiratory failure with hypoxia and hypercapnia (CMS/HCC)    COVID-19    Ascending aortic aneurysm (CMS/HCC)    Essential hypertension    Lymphedema    Vitamin D deficiency    Polyneuropathy    Bipolar 1 disorder (CMS/HCC)    Shortness of breath    Obesity, morbid, BMI 50 or higher (CMS/HCC)      PLAN:  In review of his chart I do not think COVID-19 is actually playing a huge role in his hypoxemia.  Yesterday he  had the cannula on his forehead on room air satting 92% while awake.  It appears he desaturates while he is sleeping given his morbid obesity he likely has obstructive sleep apnea and high likelihood of obesity hypoventilation syndrome with a CO2 retention.    I dropped his oxygen down to 5 L while asleep and he maintained.  Please wean oxygen to keep sats greater than 90%.    He likely has acute on chronic hypercapnic respiratory failure likely from his obesity hypoventilation syndrome.  We will continue with noninvasive ventilation.  BiPAP was ineffective so he was switched to AVAPS settings.     His CT scan of the chest is concerning for pulmonary hypertension.  -Will need further work-up for this pulmonary pretension but likely undiagnosed obesity hypoventilation syndrome and obstructive sleep apnea is playing a role.  Is probably an element of group 2 diastolic heart failure.  Consider echo while here.    Diuresis as appropriate  Continue dexamethasone daily for total of 10 days  Not a candidate for Remdesivir due to length of infection  Continue Proventil and Symbicort  He is on Lovenox for DVT prophylaxis.  We will consult case management to assist with trilogy noninvasive ventilation at discharge.     THIS DOCUMENT HAS BEEN ELECTRONICALLY SIGNED BY  Lola Mtz MD  11:59 AM

## 2020-08-17 LAB
ANION GAP SERPL CALCULATED.3IONS-SCNC: 7 MMOL/L (ref 5–15)
BASOPHILS # BLD AUTO: 0.1 10*3/MM3 (ref 0–0.2)
BASOPHILS NFR BLD AUTO: 0.9 % (ref 0–1.5)
BUN SERPL-MCNC: 22 MG/DL (ref 8–23)
BUN SERPL-MCNC: ABNORMAL MG/DL
BUN/CREAT SERPL: ABNORMAL
CALCIUM SPEC-SCNC: 8.8 MG/DL (ref 8.6–10.5)
CHLORIDE SERPL-SCNC: 95 MMOL/L (ref 98–107)
CO2 SERPL-SCNC: 32 MMOL/L (ref 22–29)
CREAT SERPL-MCNC: 0.72 MG/DL (ref 0.76–1.27)
DEPRECATED RDW RBC AUTO: 44.2 FL (ref 37–54)
EOSINOPHIL # BLD AUTO: 0 10*3/MM3 (ref 0–0.4)
EOSINOPHIL NFR BLD AUTO: 0.1 % (ref 0.3–6.2)
ERYTHROCYTE [DISTWIDTH] IN BLOOD BY AUTOMATED COUNT: 13.5 % (ref 12.3–15.4)
GFR SERPL CREATININE-BSD FRML MDRD: 110 ML/MIN/1.73
GLUCOSE BLDC GLUCOMTR-MCNC: 124 MG/DL (ref 70–105)
GLUCOSE BLDC GLUCOMTR-MCNC: 148 MG/DL (ref 70–105)
GLUCOSE BLDC GLUCOMTR-MCNC: 148 MG/DL (ref 70–105)
GLUCOSE BLDC GLUCOMTR-MCNC: 171 MG/DL (ref 70–105)
GLUCOSE BLDC GLUCOMTR-MCNC: 195 MG/DL (ref 70–105)
GLUCOSE SERPL-MCNC: 174 MG/DL (ref 65–99)
HCT VFR BLD AUTO: 45.6 % (ref 37.5–51)
HGB BLD-MCNC: 14.9 G/DL (ref 13–17.7)
LYMPHOCYTES # BLD AUTO: 0.8 10*3/MM3 (ref 0.7–3.1)
LYMPHOCYTES NFR BLD AUTO: 10.2 % (ref 19.6–45.3)
MCH RBC QN AUTO: 30.1 PG (ref 26.6–33)
MCHC RBC AUTO-ENTMCNC: 32.7 G/DL (ref 31.5–35.7)
MCV RBC AUTO: 92.1 FL (ref 79–97)
MONOCYTES # BLD AUTO: 0.2 10*3/MM3 (ref 0.1–0.9)
MONOCYTES NFR BLD AUTO: 2.7 % (ref 5–12)
NEUTROPHILS NFR BLD AUTO: 6.4 10*3/MM3 (ref 1.7–7)
NEUTROPHILS NFR BLD AUTO: 86.1 % (ref 42.7–76)
NRBC BLD AUTO-RTO: 0.1 /100 WBC (ref 0–0.2)
PLATELET # BLD AUTO: 167 10*3/MM3 (ref 140–450)
PMV BLD AUTO: 10.2 FL (ref 6–12)
POTASSIUM SERPL-SCNC: 4.8 MMOL/L (ref 3.5–5.2)
RBC # BLD AUTO: 4.95 10*6/MM3 (ref 4.14–5.8)
SODIUM SERPL-SCNC: 134 MMOL/L (ref 136–145)
WBC # BLD AUTO: 7.4 10*3/MM3 (ref 3.4–10.8)

## 2020-08-17 PROCEDURE — 82962 GLUCOSE BLOOD TEST: CPT

## 2020-08-17 PROCEDURE — 94799 UNLISTED PULMONARY SVC/PX: CPT

## 2020-08-17 PROCEDURE — 25010000002 ENOXAPARIN PER 10 MG: Performed by: INTERNAL MEDICINE

## 2020-08-17 PROCEDURE — 63710000001 INSULIN LISPRO (HUMAN) PER 5 UNITS: Performed by: INTERNAL MEDICINE

## 2020-08-17 PROCEDURE — 80048 BASIC METABOLIC PNL TOTAL CA: CPT | Performed by: INTERNAL MEDICINE

## 2020-08-17 PROCEDURE — 99232 SBSQ HOSP IP/OBS MODERATE 35: CPT | Performed by: INTERNAL MEDICINE

## 2020-08-17 PROCEDURE — 94660 CPAP INITIATION&MGMT: CPT

## 2020-08-17 PROCEDURE — 85025 COMPLETE CBC W/AUTO DIFF WBC: CPT | Performed by: INTERNAL MEDICINE

## 2020-08-17 PROCEDURE — 25010000002 DEXAMETHASONE SODIUM PHOSPHATE 10 MG/ML SOLUTION: Performed by: NURSE PRACTITIONER

## 2020-08-17 RX ADMIN — BUDESONIDE AND FORMOTEROL FUMARATE DIHYDRATE 2 PUFF: 160; 4.5 AEROSOL RESPIRATORY (INHALATION) at 19:31

## 2020-08-17 RX ADMIN — QUETIAPINE 200 MG: 100 TABLET, FILM COATED ORAL at 09:23

## 2020-08-17 RX ADMIN — ALBUTEROL SULFATE 2 PUFF: 90 AEROSOL, METERED RESPIRATORY (INHALATION) at 19:32

## 2020-08-17 RX ADMIN — ALBUTEROL SULFATE 2 PUFF: 90 AEROSOL, METERED RESPIRATORY (INHALATION) at 07:30

## 2020-08-17 RX ADMIN — QUETIAPINE 200 MG: 100 TABLET, FILM COATED ORAL at 20:49

## 2020-08-17 RX ADMIN — ZINC OXIDE: 200 OINTMENT TOPICAL at 09:23

## 2020-08-17 RX ADMIN — ALBUTEROL SULFATE 2 PUFF: 90 AEROSOL, METERED RESPIRATORY (INHALATION) at 14:23

## 2020-08-17 RX ADMIN — Medication 10 ML: at 21:01

## 2020-08-17 RX ADMIN — QUETIAPINE FUMARATE 200 MG: 50 TABLET, EXTENDED RELEASE ORAL at 20:49

## 2020-08-17 RX ADMIN — GABAPENTIN 600 MG: 300 CAPSULE ORAL at 21:01

## 2020-08-17 RX ADMIN — INSULIN LISPRO 2 UNITS: 100 INJECTION, SOLUTION INTRAVENOUS; SUBCUTANEOUS at 17:00

## 2020-08-17 RX ADMIN — DEXAMETHASONE SODIUM PHOSPHATE 6 MG: 10 INJECTION, SOLUTION INTRAMUSCULAR; INTRAVENOUS at 09:22

## 2020-08-17 RX ADMIN — DIVALPROEX SODIUM 750 MG: 250 TABLET, DELAYED RELEASE ORAL at 17:00

## 2020-08-17 RX ADMIN — Medication 10 ML: at 20:50

## 2020-08-17 RX ADMIN — GABAPENTIN 600 MG: 300 CAPSULE ORAL at 05:39

## 2020-08-17 RX ADMIN — ENOXAPARIN SODIUM 40 MG: 40 INJECTION SUBCUTANEOUS at 09:23

## 2020-08-17 RX ADMIN — ZINC OXIDE: 200 OINTMENT TOPICAL at 20:50

## 2020-08-17 RX ADMIN — DIVALPROEX SODIUM 750 MG: 250 TABLET, DELAYED RELEASE ORAL at 09:23

## 2020-08-17 RX ADMIN — DIVALPROEX SODIUM 750 MG: 250 TABLET, DELAYED RELEASE ORAL at 20:49

## 2020-08-17 RX ADMIN — GABAPENTIN 600 MG: 300 CAPSULE ORAL at 13:00

## 2020-08-17 RX ADMIN — LISINOPRIL 40 MG: 20 TABLET ORAL at 20:49

## 2020-08-17 RX ADMIN — Medication 10 ML: at 09:23

## 2020-08-17 RX ADMIN — INSULIN LISPRO 2 UNITS: 100 INJECTION, SOLUTION INTRAVENOUS; SUBCUTANEOUS at 13:00

## 2020-08-17 RX ADMIN — BUDESONIDE AND FORMOTEROL FUMARATE DIHYDRATE 2 PUFF: 160; 4.5 AEROSOL RESPIRATORY (INHALATION) at 07:30

## 2020-08-17 RX ADMIN — CELECOXIB 100 MG: 100 CAPSULE ORAL at 09:23

## 2020-08-17 RX ADMIN — CELECOXIB 100 MG: 100 CAPSULE ORAL at 20:49

## 2020-08-17 RX ADMIN — ENOXAPARIN SODIUM 40 MG: 40 INJECTION SUBCUTANEOUS at 20:49

## 2020-08-17 RX ADMIN — ALBUTEROL SULFATE 2 PUFF: 90 AEROSOL, METERED RESPIRATORY (INHALATION) at 11:37

## 2020-08-17 RX ADMIN — FUROSEMIDE 20 MG: 20 TABLET ORAL at 09:23

## 2020-08-17 RX ADMIN — AMLODIPINE BESYLATE 10 MG: 5 TABLET ORAL at 09:23

## 2020-08-17 NOTE — PROGRESS NOTES
[facility] Medicine Services Daily Progress Note      Hospitalist Team  LOS 4 days      Patient Care Team:  Steve Valerio MD as PCP - General (Hospitalist)    Patient Location: 210/1      Subjective   Subjective     Chief Complaint / Subjective  Chief Complaint   Patient presents with   • Shortness of Breath         Brief Synopsis of Hospital Course/HPI    Mr. Ramirez is a 65 y.o. morbidly obese male who was sent to the [facility] ED on 08/13/2020 from [facility] complaining of shortness of breath. The patient is from [place] and recently tested positive for COVID-19. He was sent to [facility] as they have a unit dedicated to COVID positive patients. The patient was reportedly hypoxic overnight with O2 sat mid-80s on room air. He was placed on supplemental oxygen with improvement in his saturation. Upon arrival to the ER here the patient's O2 sat was 91% on 4 liters. His ABG revealed pH 7.308, CO2 81.1, pO2 86.8, HCO3 40.6. He was placed on BiPAP.      CT chest was negative for pulmonary embolism. He was also noted have an ascending aortic aneurysm measuring 4.7 cm. His EKG showed sinus rhythm. He was admitted for further evaluation and treatment.      Admitted for acute hypoxic hypercapnic respiratory failure     Date:: 8/17/2020  Patient is seen at bedside today, he is still requiring high flow oxygen at 7 L, he denies any chest pains.  No overnight reports from the nurse.  He remains afebrile.        Review of Systems   Constitution: Negative.   HENT: Negative.    Cardiovascular: Negative.    Respiratory: Positive for cough and shortness of breath.    Skin: Negative.    Gastrointestinal: Negative.    Neurological: Negative.          Objective   Objective      Vital Signs  Temp:  [94.1 °F (34.5 °C)-98.6 °F (37 °C)] 94.1 °F (34.5 °C)  Heart Rate:  [56-78] 56  Resp:  [16-18] 17  BP: (102-126)/(56-71) 102/56  Oxygen Therapy  SpO2: 96  "%  Pulse Oximetry Type: Continuous  Device (Oxygen Therapy): nasal cannula  Device (Oxygen Therapy): high-flow nasal cannula, humidified  Flow (L/min): 7  Oxygen Concentration (%): 60  Flowsheet Rows      First Filed Value   Admission Height  185.4 cm (73\") Documented at 08/13/2020 0307   Admission Weight  (!) 174 kg (384 lb) [[facility] ] Documented at 08/13/2020 0307        Intake & Output (last 3 days)       08/14 0701 - 08/15 0700 08/15 0701 - 08/16 0700 08/16 0701 - 08/17 0700 08/17 0701 - 08/18 0700    P.O. 1200 1560 600     Total Intake(mL/kg) 1200 (6.8) 1560 (8.8) 600 (4.8)     Urine (mL/kg/hr) 1300 (0.3) 600 (0.1) 900 (0.3)     Total Output 1300 600 900     Net -100 +960 -300             Urine Unmeasured Occurrence 2 x  1 x         Lines, Drains & Airways    Active LDAs     Name:   Placement date:   Placement time:   Site:   Days:    Peripheral IV 08/13/20 0318 Left Antecubital   08/13/20 0318    Antecubital   4                  Physical Exam:    Physical Exam   Constitutional: He is oriented to person, place, and time. He appears well-developed.   Morbidly obese male patient receiving O2.  Not in obvious CP distress  He denies any chest pains   HENT:   Head: Normocephalic and atraumatic.   Eyes: Pupils are equal, round, and reactive to light. EOM are normal.   Neck: Normal range of motion. Neck supple.   Cardiovascular: Normal rate, regular rhythm, normal heart sounds and intact distal pulses.   Pulmonary/Chest:   Increased pulmonary effort with decreased breath sounds bilaterally.  No crackles no wheezing   Abdominal: Soft. Bowel sounds are normal.   Musculoskeletal: Normal range of motion.   Neurological: He is alert and oriented to person, place, and time.   Skin: Skin is warm. Capillary refill takes less than 2 seconds.   Nursing note and vitals reviewed.           Wounds (last 24 hours)      LDA Wound     Row Name 08/17/20 0742 08/16/20 1932 08/16/20 1500       " Wound 08/13/20 0845 coccyx     Wound - Properties Group Date first assessed: 08/13/20  -AF Time first assessed: 0845 -AF Location: coccyx  -AF Primary Wound Type: --  -AF, small open spot, unable to take photo, zinc ordered, wocn consulted     Dressing Appearance  open to air  -VS  open to air  -JA  open to air  -NE    Closure  Open to air  -VS  Open to air  -JA  Open to air  -NE    Base  blanchable;dry;pink  -VS  blanchable;dry;pink  -JA  blanchable;pink;dry  -NE    Periwound  intact;dry;blanchable  -VS  --  intact;dry;blanchable  -NE    Periwound Temperature  warm  -VS  --  warm  -NE    Drainage Amount  none  -VS  --  none  -NE    Care, Wound  -- zinc oxide applied   -VS  --  --    Dressing Care, Wound  -- zinc oxide applied   -VS  --  --      User Key  (r) = Recorded By, (t) = Taken By, (c) = Cosigned By    Initials Name Provider Type    AF Yovana Awan, RN Registered Nurse    VS Sturgeon, Valerie, LPN Licensed Nurse    Marycruz Ugalde LPN Licensed Nurse    NE Derrek Haddad, RN Registered Nurse          Results Review:     I reviewed the patient's new clinical results.      Lab Results (last 24 hours)     Procedure Component Value Units Date/Time    POC Glucose Once [[id number]]  (Abnormal) Collected:  08/17/20 0810    Specimen:  Blood Updated:  08/17/20 0811     Glucose 124 mg/dL      Comment: Serial Number: [id number]:  [id number]       POC Glucose Once [[id number]]  (Abnormal) Collected:  08/17/20 0020    Specimen:  Blood Updated:  08/17/20 0021     Glucose 148 mg/dL      Comment: Serial Number: [id number]:  [id number]       POC Glucose Once [[id number]]  (Abnormal) Collected:  08/16/20 2015    Specimen:  Blood Updated:  08/16/20 2016     Glucose 172 mg/dL      Comment: Serial Number: [id number]:  [id number]       POC Glucose Once [[id number]]  (Abnormal) Collected:  08/16/20 1640    Specimen:  Blood Updated:  08/16/20 1727     Glucose 183 mg/dL      Comment: Serial Number:  [id number]:  [id number]           No results found for: HGBA1C  Results from last 7 days   Lab Units 08/13/20  0318   INR  1.00       Results from last 7 days   Lab Units 08/15/20  0347   PH, ARTERIAL pH units 7.368   PO2 ART mm Hg 97.2   PCO2, ARTERIAL mm Hg 67.2*   HCO3 ART mmol/L 38.7*     No results found for: LIPASE  No results found for: CHOL, CHLPL, TRIG, HDL, LDL, LDLDIRECT    No results found for: INTRAOP, PREDX, FINALDX, COMDX    Microbiology Results (last 10 days)     Procedure Component Value - Date/Time    S. Pneumo Ag Urine or CSF - Urine, Urine, Clean Catch [[id number]]  (Normal) Collected:  08/13/20 1810    Lab Status:  Final result Specimen:  Urine, Clean Catch Updated:  08/13/20 1856     Strep Pneumo Ag Negative    Legionella Antigen, Urine - Urine, Urine, Clean Catch [[id number]]  (Normal) Collected:  08/13/20 1810    Lab Status:  Final result Specimen:  Urine, Clean Catch Updated:  08/13/20 1855     LEGIONELLA ANTIGEN, URINE Negative    Respiratory Panel PCR w/COVID-19(SARS-CoV-2) IMMANUEL/JESSICA/JOSEMANUEL/PAD In-House, NP Swab in UTM/VTM, 3-4 HR TAT - Swab, Nasopharynx [[id number]]  (Abnormal) Collected:  08/13/20 1253    Lab Status:  Final result Specimen:  Swab from Nasopharynx Updated:  08/13/20 1416     ADENOVIRUS, PCR Not Detected     Coronavirus 229E Not Detected     Coronavirus HKU1 Not Detected     Coronavirus NL63 Not Detected     Coronavirus OC43 Not Detected     COVID19 Detected     Human Metapneumovirus Not Detected     Human Rhinovirus/Enterovirus Not Detected     Influenza A PCR Not Detected     Influenza A H1 Not Detected     Influenza A H1 2009 PCR Not Detected     Influenza A H3 Not Detected     Influenza B PCR Not Detected     Parainfluenza Virus 1 Not Detected     Parainfluenza Virus 2 Not Detected     Parainfluenza Virus 3 Not Detected     Parainfluenza Virus 4 Not Detected     RSV, PCR Not Detected     Bordetella pertussis pcr Not Detected     Bordetella parapertussis PCR Not Detected      Chlamydophila pneumoniae PCR Not Detected     Mycoplasma pneumo by PCR Not Detected    Narrative:       Fact sheet for providers: https://docs.[employer]Â®/wp-content/uploads/GCI8537-3643-TF4.1-EUA-Provider-Fact-Sheet-3.pdf    Fact sheet for patients: https://docs.[employer]Â®/wp-content/uploads/WJP7181-2897-UQ7.1-EUA-Patient-Fact-Sheet-1.pdf          ECG/EMG Results (most recent)     Procedure Component Value Units Date/Time    ECG 12 Lead [[id number]] Collected:  08/13/20 0302     Updated:  08/14/20 1708    Narrative:       HEART RATE= 79  bpm  RR Interval= 760  ms  NH Interval= 192  ms  P Horizontal Axis= 10  deg  P Front Axis= 42  deg  QRSD Interval= 126  ms  QT Interval= 384  ms  QRS Axis= -55  deg  T Wave Axis= 101  deg  - ABNORMAL ECG -  Sinus rhythm  Nonspecific IVCD with LAD  Inferior infarct, old  Nonspecific T abnormalities, lateral leads  No previous ECG available for comparison  Electronically Signed By: Davi Hinojosa (Babatunde) 14-Aug-2020 16:57:21  Date and Time of Study: 2020-08-13 03:02:12                    Ct Chest Pulmonary Embolism    Result Date: 8/13/2020  1. No evidence of pulmonary embolism. No evidence of an acute intrathoracic process. 2. Borderline enlarged right hilar lymph nodes, nonspecific. 3. Fusiform ascending aortic aneurysm measuring 4.7 cm in diameter. 4. Enlarged pulmonary trunk. This can be seen in the setting of pulmonary arterial hypertension. 5. Coronary artery atherosclerosis. 6. Nonobstructing stones in each kidney. Electronically signed by:  Davey Boo M.D.  8/13/2020 2:51 AM          Xrays, labs reviewed personally by physician.    Medication Review:   I have reviewed the patient's current medication list      Scheduled Meds    albuterol sulfate HFA 2 puff Inhalation 4x Daily - RT   amLODIPine 10 mg Oral Daily   budesonide-formoterol 2 puff Inhalation BID - RT   celecoxib 100 mg Oral BID   dexamethasone 6 mg Intravenous Daily   divalproex 750 mg Oral TID   enoxaparin  40 mg Subcutaneous Q12H   furosemide 20 mg Oral Daily   gabapentin 600 mg Oral Q8H   insulin lispro 0-9 Units Subcutaneous 4x Daily With Meals & Nightly   lisinopril 40 mg Oral Q24H   QUEtiapine 200 mg Oral BID   QUEtiapine  mg Oral Nightly   sodium chloride 10 mL Intravenous Q12H   zinc oxide  Topical Q12H       Meds Infusions    Pharmacy Consult - Steroid Insulin Protocol        Meds PRN  •  acetaminophen **OR** acetaminophen **OR** acetaminophen  •  albuterol sulfate HFA  •  aluminum-magnesium hydroxide-simethicone  •  bisacodyl  •  cyclobenzaprine  •  dextrose  •  dextrose  •  glucagon (human recombinant)  •  hydrALAZINE  •  insulin lispro **AND** insulin lispro  •  magnesium hydroxide  •  magnesium sulfate **OR** magnesium sulfate in D5W 1g/100mL (PREMIX)  •  melatonin  •  nitroglycerin  •  ondansetron **OR** ondansetron  •  Pharmacy Consult - Steroid Insulin Protocol  •  potassium chloride **OR** potassium chloride **OR** potassium chloride  •  sodium chloride        Assessment/Plan   Assessment/Plan     Active Hospital Problems    Diagnosis  POA   • **Acute respiratory failure with hypoxia and hypercapnia (CMS/HCC) [J96.01, J96.02]  Yes   • COVID-19 [U07.1]  Yes   • Ascending aortic aneurysm (CMS/HCC) [I71.2]  Yes   • Essential hypertension [I10]  Yes   • Lymphedema [I89.0]  Yes   • Vitamin D deficiency [E55.9]  Yes   • Polyneuropathy [G62.9]  Yes   • Bipolar 1 disorder (CMS/HCC) [F31.9]  Yes   • Shortness of breath [R06.02]  Yes   • Obesity, morbid, BMI 50 or higher (CMS/HCC) [E66.01]  Yes      Resolved Hospital Problems   No resolved problems to display.       MEDICAL DECISION MAKING COMPLEXITY BY PROBLEM:         Mr. Ramirez is a 65 y.o. morbidly obese male who was sent to the [facility] ED on 08/13/2020 from [facility] complaining of shortness of breath. The patient is from [place] and recently tested positive for COVID-19. He was sent to [facility]  as they have a unit dedicated to COVID positive patients. The patient was reportedly hypoxic overnight with O2 sat mid-80s on room air. He was placed on supplemental oxygen with improvement in his saturation. Upon arrival to the ER here the patient's O2 sat was 91% on 4 liters. His ABG revealed pH 7.308, CO2 81.1, pO2 86.8, HCO3 40.6. He was placed on BiPAP.      CT chest was negative for pulmonary embolism. He was also noted have an ascending aortic aneurysm measuring 4.7 cm. His EKG showed sinus rhythm. He was admitted for further evaluation and treatment.       Assessment  Acute hypoxic hypercapnic respiratory failure secondary to COVID-19 infection  PE has been ruled out by CTA  CT concerning for pulmonary hypertension, will defer management to pulmonologist  Significant consideration for obesity hypoventilation syndrome and obstructive sleep apnea, defer management to pulmonologist  Pulmonary services appreciated  We will continue O2 currently the patient is on high flow oxygen at 7 L, we will monitor him daily and titrate  Continue dexamethasone  Continue albuterol and Symbicort  So far strep and Legionella is unremarkable  Maintain isolation  Continue Lasix 20 mg daily and monitor renal function very closely  Not a candidate for Remdesir  due to length of infection  Continue with noninvasive ventilation.  BiPAP was ineffective so he was switched to AVAPS settings.   Sleep study as OP vs trilogy.  AVAPS every night and as needed during during daytime.      COVID 19  previously diagnosed   continue supplemental oxygen and steroids  not a candidate for Remdesivir     Ascending aortic aneurysm  incidental finding on CT scan, measures 4.7 cm  referral to vascular surgery once COVID negative     Essential hypertension, chronic, controlled   amlodipine, lisinopril and lasix    monitor with routine VS and make adjustments as needed      Lymphedema  Cont po lasix      Vitamin D deficiency  hold supplement       Polyneuropathy  continue Celebrex, Flexeril and gabapentin     Bipolar 1 disorder   Depakote and Seroquel       Morbid obesity    on dietary changes      DVT Prophylaxis  enoxaparin         VTE Prophylaxis -   Mechanical Order History:     None      Pharmalogical Order History:     Ordered     Dose Route Frequency Stop    08/14/20 1420  enoxaparin (LOVENOX) syringe 40 mg      40 mg SC Every 12 Hours --    08/13/20 0734  enoxaparin (LOVENOX) syringe 90 mg  Status:  Discontinued      0.5 mg/kg SC Every 12 Hours 08/14/20 1420            Code Status -   Code Status and Medical Interventions:   Ordered at: 08/13/20 0734     Code Status:    CPR     Medical Interventions (Level of Support Prior to Arrest):    Full       This patient has been examined wearing appropriate Personal Protective Equipment     Discharge Planning  Back to facility          Electronically signed by Padmaja Schilling MD, 08/17/20, 11:15.  [facility] Hospitalist Team

## 2020-08-17 NOTE — PLAN OF CARE
Problem: Patient Care Overview  Goal: Plan of Care Review  Outcome: Ongoing (interventions implemented as appropriate)  Flowsheets (Taken 8/17/2020 5100)  Progress: no change  Plan of Care Reviewed With: patient  Outcome Summary: pt remains on O2 at 6L high flow, reduced from 8L high flow; turn every 2 hours; voiced no c/o pain; will continue to monitor

## 2020-08-17 NOTE — PLAN OF CARE
Patient is resting abed, no complaints voiced. He has been encouraged to reposition and does so with help. He can make changes in position on his own as well.

## 2020-08-17 NOTE — PROGRESS NOTES
KPA/PULM/CC PROGRESS NOTE    65 y.o. male who was referred for consultation for shortness of breath and respiratory failure.  Patient is a morbidly obese male with remote history of tobacco use but he denies any previous history of COPD or emphysema.  Patient was recently diagnosed with COVID-19 infection and in [place] a few weeks ago.  He subsequently was transferred to a local nursing home.  Patient was noted to have some low O2 sats yesterday.  He was placed on 3 L nasal cannula and was subsequently transferred to the ER for further evaluation.  Patient was evaluated in the ER.  He required BiPAP therapy and was admitted to the hospital.  I have been asked to see him for further evaluation.  At time of my evaluation patient awakens easily.  He does complain of some shortness of breath.  He does complain of some cough.  He does complain of some nausea and diarrhea.  He denies any chest pain or palpitations.  He denies any vomiting.  His shortness of breath gets worse with any activity.  It gets better with resting and use of supplemental oxygen   SUBJECTIVE    8/14: Awake.  Remains on noninvasive ventilation.  More awake and appropriate today.  Breathing some better.  No complaints of chest pain.  No nausea or vomiting  8/15: Currently tolerating 15 L high flow.  Afebrile overnight.  Blood pressure stable  8/16: No acute events overnight.  Currently on 8 L high flow cannula.  No nausea vomiting.  Afebrile.  8/17: Remains Afebrile. O2 requirement stable at 8L. Did not use the CPAP last night      OBJECTIVE    Vitals:    08/16/20 1932 08/16/20 2300 08/17/20 0304 08/17/20 0730   BP: 113/64 123/69 126/71    BP Location: Right arm Right arm Right arm    Patient Position: Lying Lying Lying    Pulse: 74 65 61 56   Resp: 16 16 16 16   Temp: 98 °F (36.7 °C) 98 °F (36.7 °C) 97.7 °F (36.5 °C)    TempSrc: Oral Oral Oral    SpO2: 94% 91% 94% 95%   Weight:   124 kg (272 lb 11.3 oz)    Height:          Intake/Output last 3  shifts:  I/O last 3 completed shifts:  In: 1080 [P.O.:1080]  Out: 900 [Urine:900]  Intake/Output this shift:  No intake/output data recorded.    Constitutional: Morbidly obese, chronically ill-appearing, no acute distress, non-toxic appearance   Eyes:   conjunctiva normal   HENT:  Atraumatic, external ears normal, nose normal  Neck- normal range of motion, no tenderness, supple   Respiratory:  No respiratory distress, normal breath sounds, no rales, no wheezing   Cardiovascular:  Normal rate, normal rhythm, no murmurs, no gallops, no rubs   GI:  Soft, nondistended, normal bowel sounds, nontender, no rebound, no guarding   Musculoskeletal:  + edema, no tenderness, no deformities.  Integument:  Well hydrated, no rash   Neurologic: Awake, CN 2-12 normal, normal motor function, normal sensory function, no focal deficits noted   Psychiatric:  Speech and behavior appropriate     Scheduled Meds:    albuterol sulfate HFA 2 puff Inhalation 4x Daily - RT   amLODIPine 10 mg Oral Daily   budesonide-formoterol 2 puff Inhalation BID - RT   celecoxib 100 mg Oral BID   dexamethasone 6 mg Intravenous Daily   divalproex 750 mg Oral TID   enoxaparin 40 mg Subcutaneous Q12H   furosemide 20 mg Oral Daily   gabapentin 600 mg Oral Q8H   insulin lispro 0-9 Units Subcutaneous 4x Daily With Meals & Nightly   lisinopril 40 mg Oral Q24H   QUEtiapine 200 mg Oral BID   QUEtiapine  mg Oral Nightly   sodium chloride 10 mL Intravenous Q12H   zinc oxide  Topical Q12H       Continuous Infusions:    Pharmacy Consult - Steroid Insulin Protocol        PRN Meds:•  acetaminophen **OR** acetaminophen **OR** acetaminophen  •  albuterol sulfate HFA  •  aluminum-magnesium hydroxide-simethicone  •  bisacodyl  •  cyclobenzaprine  •  dextrose  •  dextrose  •  glucagon (human recombinant)  •  hydrALAZINE  •  insulin lispro **AND** insulin lispro  •  magnesium hydroxide  •  magnesium sulfate **OR** magnesium sulfate in D5W 1g/100mL (PREMIX)  •  melatonin  •   nitroglycerin  •  ondansetron **OR** ondansetron  •  Pharmacy Consult - Steroid Insulin Protocol  •  potassium chloride **OR** potassium chloride **OR** potassium chloride  •  sodium chloride     LABS    CBC  Results from last 7 days   Lab Units 08/15/20  0315 08/14/20  0716 08/13/20  0318   WBC 10*3/mm3 7.70 8.30 5.70   RBC 10*6/mm3 4.74 5.15 5.12   HEMOGLOBIN g/dL 14.5 15.4 15.4   HEMATOCRIT % 44.3 47.3 47.5   MCV fL 93.4 91.7 92.7   PLATELETS 10*3/mm3 207 115* 226       CMP   Results from last 7 days   Lab Units 08/16/20  0524 08/15/20  0315 08/14/20  0716 08/13/20  0318   SODIUM mmol/L  --  138 138 141   POTASSIUM mmol/L 4.6 4.4 4.4 4.6   CHLORIDE mmol/L  --  96* 97* 99   CO2 mmol/L  --  33.0* 34.0* 36.0*   BUN   --  23 22 17   CREATININE mg/dL  --  0.71* 0.71* 0.78   GLUCOSE mg/dL  --  119* 106* 97   ALBUMIN g/dL  --  3.50 3.50 3.50   BILIRUBIN mg/dL  --  0.4 0.5 0.4   ALK PHOS U/L  --  37* 42 40   AST (SGOT) U/L  --  30 26 19   ALT (SGPT) U/L  --  30 26 21       TROPONIN  Results from last 7 days   Lab Units 08/16/20  0524 08/13/20 0318   CK TOTAL U/L 29   < > 45   TROPONIN T ng/mL  --   --  <0.010    < > = values in this interval not displayed.       CoAg  Results from last 7 days   Lab Units 08/13/20 0318   INR  1.00   APTT seconds 29.3       ABG  Results from last 7 days   Lab Units 08/15/20  0347 08/14/20  1140 08/13/20  0815 08/13/20  0343   PH, ARTERIAL pH units 7.368 7.369 7.285* 7.308*   PCO2, ARTERIAL mm Hg 67.2* 70.5* 82.7* 81.1*   PO2 ART mm Hg 97.2 87.2 94.8 86.8   O2 SATURATION ART % 97.0 95.8 95.7 94.8   BASE EXCESS ART mmol/L 9.8* 10.8* 8.1* 9.6*       Microbiology  Microbiology Results (last 10 days)     Procedure Component Value - Date/Time    S. Pneumo Ag Urine or CSF - Urine, Urine, Clean Catch [[id number]]  (Normal) Collected:  08/13/20 1810    Lab Status:  Final result Specimen:  Urine, Clean Catch Updated:  08/13/20 1856     Strep Pneumo Ag Negative    Legionella Antigen, Urine - Urine,  Urine, Clean Catch [[id number]]  (Normal) Collected:  08/13/20 1810    Lab Status:  Final result Specimen:  Urine, Clean Catch Updated:  08/13/20 1855     LEGIONELLA ANTIGEN, URINE Negative    Respiratory Panel PCR w/COVID-19(SARS-CoV-2) [facility] In-House, NP Swab in UTM/VTM, 3-4 HR TAT - Swab, Nasopharynx [[id number]]  (Abnormal) Collected:  08/13/20 1253    Lab Status:  Final result Specimen:  Swab from Nasopharynx Updated:  08/13/20 1416     ADENOVIRUS, PCR Not Detected     Coronavirus 229E Not Detected     Coronavirus HKU1 Not Detected     Coronavirus NL63 Not Detected     Coronavirus OC43 Not Detected     COVID19 Detected     Human Metapneumovirus Not Detected     Human Rhinovirus/Enterovirus Not Detected     Influenza A PCR Not Detected     Influenza A H1 Not Detected     Influenza A H1 2009 PCR Not Detected     Influenza A H3 Not Detected     Influenza B PCR Not Detected     Parainfluenza Virus 1 Not Detected     Parainfluenza Virus 2 Not Detected     Parainfluenza Virus 3 Not Detected     Parainfluenza Virus 4 Not Detected     RSV, PCR Not Detected     Bordetella pertussis pcr Not Detected     Bordetella parapertussis PCR Not Detected     Chlamydophila pneumoniae PCR Not Detected     Mycoplasma pneumo by PCR Not Detected    Narrative:       Fact sheet for providers: https://docs.[employer]/wp-content/uploads/EUP5417-5764-FS0.1-EUA-Provider-Fact-Sheet-3.pdf    Fact sheet for patients: https://docs.[employer]/wp-content/uploads/BCY7351-9080-KL4.1-EUA-Patient-Fact-Sheet-1.pdf          IMAGING & OTHER STUDIES    Imaging Results (Last 72 Hours)     ** No results found for the last 72 hours. **                ASSESSMENT/PLAN:     Acute respiratory failure with hypoxia and hypercapnia (CMS/HCC)    COVID-19    Ascending aortic aneurysm (CMS/HCC)    Essential hypertension    Lymphedema    Vitamin D deficiency    Polyneuropathy    Bipolar 1 disorder (CMS/HCC)    Shortness of breath    Obesity, morbid, BMI  50 or higher (CMS/HCC)  suspected NIGEL/OHS    PLAN:  Wean FiO2 as tolerated. Stable at 8L HFNC. Titrate for sats above 90%.      He likely has acute on chronic hypercapnic respiratory failure likely from his obesity hypoventilation syndrome.  We will continue with noninvasive ventilation.  BiPAP was ineffective so he was switched to AVAPS settings. Recommend Sleep study as OP vs trilogy. He is encouraged to use AVAPS every night and as needed during during daytime.    His CT scan of the chest is concerning for pulmonary hypertension.  -Will need further work-up for this pulmonary pretension but likely undiagnosed obesity hypoventilation syndrome and obstructive sleep apnea is playing a role.  Is probably an element of group 2 diastolic heart failure.  Consider echo while here.    Diuresis as appropriate  Continue dexamethasone daily for total of 10 days  Not a candidate for Remdesivir due to length of infection  Continue Proventil and Symbicort  He is on Lovenox for DVT prophylaxis.     Will follow    THIS DOCUMENT HAS BEEN ELECTRONICALLY SIGNED BY  Lino Forrester MD  11:59 AM

## 2020-08-18 LAB
ANION GAP SERPL CALCULATED.3IONS-SCNC: 10 MMOL/L (ref 5–15)
ANISOCYTOSIS BLD QL: NORMAL
BASOPHILS # BLD AUTO: 0.1 10*3/MM3 (ref 0–0.2)
BASOPHILS NFR BLD AUTO: 1 % (ref 0–1.5)
BUN SERPL-MCNC: 22 MG/DL (ref 8–23)
BUN SERPL-MCNC: ABNORMAL MG/DL
BUN/CREAT SERPL: ABNORMAL
CALCIUM SPEC-SCNC: 8.5 MG/DL (ref 8.6–10.5)
CHLORIDE SERPL-SCNC: 92 MMOL/L (ref 98–107)
CO2 SERPL-SCNC: 28 MMOL/L (ref 22–29)
CREAT SERPL-MCNC: 0.69 MG/DL (ref 0.76–1.27)
DEPRECATED RDW RBC AUTO: 44.2 FL (ref 37–54)
EOSINOPHIL # BLD AUTO: 0 10*3/MM3 (ref 0–0.4)
EOSINOPHIL NFR BLD AUTO: 0.5 % (ref 0.3–6.2)
ERYTHROCYTE [DISTWIDTH] IN BLOOD BY AUTOMATED COUNT: 13.7 % (ref 12.3–15.4)
GFR SERPL CREATININE-BSD FRML MDRD: 115 ML/MIN/1.73
GLUCOSE BLDC GLUCOMTR-MCNC: 107 MG/DL (ref 70–105)
GLUCOSE BLDC GLUCOMTR-MCNC: 124 MG/DL (ref 70–105)
GLUCOSE BLDC GLUCOMTR-MCNC: 133 MG/DL (ref 70–105)
GLUCOSE BLDC GLUCOMTR-MCNC: 180 MG/DL (ref 70–105)
GLUCOSE SERPL-MCNC: 169 MG/DL (ref 65–99)
HCT VFR BLD AUTO: 47.8 % (ref 37.5–51)
HGB BLD-MCNC: 15.6 G/DL (ref 13–17.7)
LYMPHOCYTES # BLD AUTO: 1.8 10*3/MM3 (ref 0.7–3.1)
LYMPHOCYTES NFR BLD AUTO: 19.3 % (ref 19.6–45.3)
MCH RBC QN AUTO: 29.7 PG (ref 26.6–33)
MCHC RBC AUTO-ENTMCNC: 32.5 G/DL (ref 31.5–35.7)
MCV RBC AUTO: 91.3 FL (ref 79–97)
MONOCYTES # BLD AUTO: 0.5 10*3/MM3 (ref 0.1–0.9)
MONOCYTES NFR BLD AUTO: 5.6 % (ref 5–12)
NEUTROPHILS NFR BLD AUTO: 6.7 10*3/MM3 (ref 1.7–7)
NEUTROPHILS NFR BLD AUTO: 73.6 % (ref 42.7–76)
NRBC BLD AUTO-RTO: 0.3 /100 WBC (ref 0–0.2)
PLAT MORPH BLD: NORMAL
PLATELET # BLD AUTO: 127 10*3/MM3 (ref 140–450)
PMV BLD AUTO: 9.9 FL (ref 6–12)
POTASSIUM SERPL-SCNC: 4.3 MMOL/L (ref 3.5–5.2)
RBC # BLD AUTO: 5.24 10*6/MM3 (ref 4.14–5.8)
SODIUM SERPL-SCNC: 130 MMOL/L (ref 136–145)
WBC # BLD AUTO: 9.2 10*3/MM3 (ref 3.4–10.8)
WBC MORPH BLD: NORMAL

## 2020-08-18 PROCEDURE — 94799 UNLISTED PULMONARY SVC/PX: CPT

## 2020-08-18 PROCEDURE — 63710000001 INSULIN LISPRO (HUMAN) PER 5 UNITS: Performed by: INTERNAL MEDICINE

## 2020-08-18 PROCEDURE — 94640 AIRWAY INHALATION TREATMENT: CPT

## 2020-08-18 PROCEDURE — 25010000002 ENOXAPARIN PER 10 MG: Performed by: INTERNAL MEDICINE

## 2020-08-18 PROCEDURE — 80048 BASIC METABOLIC PNL TOTAL CA: CPT | Performed by: INTERNAL MEDICINE

## 2020-08-18 PROCEDURE — 25010000002 DEXAMETHASONE SODIUM PHOSPHATE 10 MG/ML SOLUTION: Performed by: NURSE PRACTITIONER

## 2020-08-18 PROCEDURE — 85007 BL SMEAR W/DIFF WBC COUNT: CPT | Performed by: INTERNAL MEDICINE

## 2020-08-18 PROCEDURE — 99232 SBSQ HOSP IP/OBS MODERATE 35: CPT | Performed by: INTERNAL MEDICINE

## 2020-08-18 PROCEDURE — 82962 GLUCOSE BLOOD TEST: CPT

## 2020-08-18 PROCEDURE — 85025 COMPLETE CBC W/AUTO DIFF WBC: CPT | Performed by: INTERNAL MEDICINE

## 2020-08-18 RX ORDER — DEXAMETHASONE 6 MG/1
6 TABLET ORAL
Status: DISCONTINUED | OUTPATIENT
Start: 2020-08-18 | End: 2020-08-20 | Stop reason: HOSPADM

## 2020-08-18 RX ADMIN — GABAPENTIN 600 MG: 300 CAPSULE ORAL at 13:07

## 2020-08-18 RX ADMIN — ALBUTEROL SULFATE 2 PUFF: 90 AEROSOL, METERED RESPIRATORY (INHALATION) at 14:54

## 2020-08-18 RX ADMIN — LISINOPRIL 40 MG: 20 TABLET ORAL at 21:21

## 2020-08-18 RX ADMIN — ZINC OXIDE: 200 OINTMENT TOPICAL at 08:51

## 2020-08-18 RX ADMIN — ALBUTEROL SULFATE 2 PUFF: 90 AEROSOL, METERED RESPIRATORY (INHALATION) at 06:47

## 2020-08-18 RX ADMIN — QUETIAPINE 200 MG: 100 TABLET, FILM COATED ORAL at 08:51

## 2020-08-18 RX ADMIN — QUETIAPINE FUMARATE 200 MG: 50 TABLET, EXTENDED RELEASE ORAL at 21:20

## 2020-08-18 RX ADMIN — GABAPENTIN 600 MG: 300 CAPSULE ORAL at 06:05

## 2020-08-18 RX ADMIN — DIVALPROEX SODIUM 750 MG: 250 TABLET, DELAYED RELEASE ORAL at 21:20

## 2020-08-18 RX ADMIN — CELECOXIB 100 MG: 100 CAPSULE ORAL at 21:20

## 2020-08-18 RX ADMIN — INSULIN LISPRO 2 UNITS: 100 INJECTION, SOLUTION INTRAVENOUS; SUBCUTANEOUS at 21:19

## 2020-08-18 RX ADMIN — QUETIAPINE 200 MG: 100 TABLET, FILM COATED ORAL at 21:20

## 2020-08-18 RX ADMIN — GABAPENTIN 600 MG: 300 CAPSULE ORAL at 21:20

## 2020-08-18 RX ADMIN — ENOXAPARIN SODIUM 40 MG: 40 INJECTION SUBCUTANEOUS at 08:51

## 2020-08-18 RX ADMIN — Medication 5000 UNITS: at 12:48

## 2020-08-18 RX ADMIN — ENOXAPARIN SODIUM 40 MG: 40 INJECTION SUBCUTANEOUS at 21:19

## 2020-08-18 RX ADMIN — BUDESONIDE AND FORMOTEROL FUMARATE DIHYDRATE 2 PUFF: 160; 4.5 AEROSOL RESPIRATORY (INHALATION) at 19:30

## 2020-08-18 RX ADMIN — DIVALPROEX SODIUM 750 MG: 250 TABLET, DELAYED RELEASE ORAL at 08:51

## 2020-08-18 RX ADMIN — BUDESONIDE AND FORMOTEROL FUMARATE DIHYDRATE 2 PUFF: 160; 4.5 AEROSOL RESPIRATORY (INHALATION) at 06:47

## 2020-08-18 RX ADMIN — ALBUTEROL SULFATE 2 PUFF: 90 AEROSOL, METERED RESPIRATORY (INHALATION) at 10:42

## 2020-08-18 RX ADMIN — Medication 10 ML: at 08:52

## 2020-08-18 RX ADMIN — AMLODIPINE BESYLATE 10 MG: 5 TABLET ORAL at 08:51

## 2020-08-18 RX ADMIN — ALBUTEROL SULFATE 2 PUFF: 90 AEROSOL, METERED RESPIRATORY (INHALATION) at 19:30

## 2020-08-18 RX ADMIN — FUROSEMIDE 20 MG: 20 TABLET ORAL at 08:51

## 2020-08-18 RX ADMIN — CELECOXIB 100 MG: 100 CAPSULE ORAL at 08:51

## 2020-08-18 RX ADMIN — DEXAMETHASONE 6 MG: 6 TABLET ORAL at 12:48

## 2020-08-18 RX ADMIN — ZINC OXIDE: 200 OINTMENT TOPICAL at 21:20

## 2020-08-18 RX ADMIN — DIVALPROEX SODIUM 750 MG: 250 TABLET, DELAYED RELEASE ORAL at 16:42

## 2020-08-18 NOTE — PROGRESS NOTES
KPA/PULM/CC PROGRESS NOTE    65 y.o. male who was referred for consultation for shortness of breath and respiratory failure.  Patient is a morbidly obese male with remote history of tobacco use but he denies any previous history of COPD or emphysema.  Patient was recently diagnosed with COVID-19 infection and in [place] a few weeks ago.  He subsequently was transferred to a local nursing home.  Patient was noted to have some low O2 sats yesterday.  He was placed on 3 L nasal cannula and was subsequently transferred to the ER for further evaluation.  Patient was evaluated in the ER.  He required BiPAP therapy and was admitted to the hospital.  I have been asked to see him for further evaluation.  At time of my evaluation patient awakens easily.  He does complain of some shortness of breath.  He does complain of some cough.  He does complain of some nausea and diarrhea.  He denies any chest pain or palpitations.  He denies any vomiting.  His shortness of breath gets worse with any activity.  It gets better with resting and use of supplemental oxygen   SUBJECTIVE    8/14: Awake.  Remains on noninvasive ventilation.  More awake and appropriate today.  Breathing some better.  No complaints of chest pain.  No nausea or vomiting  8/15: Currently tolerating 15 L high flow.  Afebrile overnight.  Blood pressure stable  8/16: No acute events overnight.  Currently on 8 L high flow cannula.  No nausea vomiting.  Afebrile.  8/17: Remains Afebrile. O2 requirement stable at 8L. Did not use the CPAP last night  8/18: Shortness of breath is stable.  Oxygen requirement down to 6 L.  Did use CPAP last night some however did not like it      OBJECTIVE    Vitals:    08/18/20 0847 08/18/20 1042 08/18/20 1155 08/18/20 1454   BP: 125/73  120/66    BP Location:   Right arm    Patient Position:   Lying    Pulse: 67 73 72 70   Resp: 18 19 17 18   Temp: 96.4 °F (35.8 °C)  96 °F (35.6 °C)    TempSrc:   Oral    SpO2: 92% 92% 93% 94%      Weight:       Height:          Intake/Output last 3 shifts:  I/O last 3 completed shifts:  In: 1200 [P.O.:1200]  Out: 2250 [Urine:2250]  Intake/Output this shift:  I/O this shift:  In: 480 [P.O.:480]  Out: 500 [Urine:500]    Constitutional: Morbidly obese, chronically ill-appearing, no acute distress, non-toxic appearance   Eyes:   conjunctiva normal   HENT:  Atraumatic, external ears normal, nose normal  Neck- normal range of motion, no tenderness, supple   Respiratory:  No respiratory distress, normal breath sounds, no rales, no wheezing   Cardiovascular:  Normal rate, normal rhythm, no murmurs, no gallops, no rubs   GI:  Soft, nondistended, normal bowel sounds, nontender, no rebound, no guarding   Musculoskeletal:  + edema, no tenderness, no deformities.  Integument:  Well hydrated, no rash   Neurologic: Awake, CN 2-12 normal, normal motor function, normal sensory function, no focal deficits noted   Psychiatric:  Speech and behavior appropriate     Scheduled Meds:    albuterol sulfate HFA 2 puff Inhalation 4x Daily - RT   amLODIPine 10 mg Oral Daily   budesonide-formoterol 2 puff Inhalation BID - RT   celecoxib 100 mg Oral BID   dexamethasone 6 mg Oral Daily With Breakfast   divalproex 750 mg Oral TID   enoxaparin 40 mg Subcutaneous Q12H   furosemide 20 mg Oral Daily   gabapentin 600 mg Oral Q8H   insulin lispro 0-9 Units Subcutaneous 4x Daily With Meals & Nightly   lisinopril 40 mg Oral Q24H   QUEtiapine 200 mg Oral BID   QUEtiapine  mg Oral Nightly   sodium chloride 10 mL Intravenous Q12H   vitamin D3 5,000 Units Oral Daily   zinc oxide  Topical Q12H       Continuous Infusions:       PRN Meds:•  acetaminophen **OR** acetaminophen **OR** acetaminophen  •  albuterol sulfate HFA  •  aluminum-magnesium hydroxide-simethicone  •  bisacodyl  •  cyclobenzaprine  •  dextrose  •  dextrose  •  glucagon (human recombinant)  •  hydrALAZINE  •  insulin lispro **AND** insulin lispro  •  magnesium hydroxide  •   magnesium sulfate **OR** magnesium sulfate in D5W 1g/100mL (PREMIX)  •  melatonin  •  nitroglycerin  •  ondansetron **OR** ondansetron  •  potassium chloride **OR** potassium chloride **OR** potassium chloride  •  sodium chloride     LABS    CBC  Results from last 7 days   Lab Units 08/18/20  0719 08/17/20  1318 08/15/20  0315 08/14/20 0716 08/13/20 0318   WBC 10*3/mm3 9.20 7.40 7.70 8.30 5.70   RBC 10*6/mm3 5.24 4.95 4.74 5.15 5.12   HEMOGLOBIN g/dL 15.6 14.9 14.5 15.4 15.4   HEMATOCRIT % 47.8 45.6 44.3 47.3 47.5   MCV fL 91.3 92.1 93.4 91.7 92.7   PLATELETS 10*3/mm3 127* 167 207 115* 226       CMP   Results from last 7 days   Lab Units 08/18/20  1036 08/17/20  1318 08/16/20  0524 08/15/20  0315 08/14/20  0716 08/13/20  0318   SODIUM mmol/L 130* 134*  --  138 138 141   POTASSIUM mmol/L 4.3 4.8 4.6 4.4 4.4 4.6   CHLORIDE mmol/L 92* 95*  --  96* 97* 99   CO2 mmol/L 28.0 32.0*  --  33.0* 34.0* 36.0*   BUN   --  22  --  23 22 17   CREATININE mg/dL 0.69* 0.72*  --  0.71* 0.71* 0.78   GLUCOSE mg/dL 169* 174*  --  119* 106* 97   ALBUMIN g/dL  --   --   --  3.50 3.50 3.50   BILIRUBIN mg/dL  --   --   --  0.4 0.5 0.4   ALK PHOS U/L  --   --   --  37* 42 40   AST (SGOT) U/L  --   --   --  30 26 19   ALT (SGPT) U/L  --   --   --  30 26 21       TROPONIN  Results from last 7 days   Lab Units 08/16/20  0524 08/13/20 0318   CK TOTAL U/L 29   < > 45   TROPONIN T ng/mL  --   --  <0.010    < > = values in this interval not displayed.       CoAg  Results from last 7 days   Lab Units 08/13/20  0318   INR  1.00   APTT seconds 29.3       ABG  Results from last 7 days   Lab Units 08/15/20  0347 08/14/20  1140 08/13/20  0815 08/13/20  0343   PH, ARTERIAL pH units 7.368 7.369 7.285* 7.308*   PCO2, ARTERIAL mm Hg 67.2* 70.5* 82.7* 81.1*   PO2 ART mm Hg 97.2 87.2 94.8 86.8   O2 SATURATION ART % 97.0 95.8 95.7 94.8   BASE EXCESS ART mmol/L 9.8* 10.8* 8.1* 9.6*       Microbiology  Microbiology Results (last 10 days)     Procedure  Component Value - Date/Time    S. Pneumo Ag Urine or CSF - Urine, Urine, Clean Catch [[id number]]  (Normal) Collected:  08/13/20 1810    Lab Status:  Final result Specimen:  Urine, Clean Catch Updated:  08/13/20 1856     Strep Pneumo Ag Negative    Legionella Antigen, Urine - Urine, Urine, Clean Catch [[id number]]  (Normal) Collected:  08/13/20 1810    Lab Status:  Final result Specimen:  Urine, Clean Catch Updated:  08/13/20 1855     LEGIONELLA ANTIGEN, URINE Negative    Respiratory Panel PCR w/COVID-19(SARS-CoV-2) [facility] In-House, NP Swab in UTM/VTM, 3-4 HR TAT - Swab, Nasopharynx [[id number]]  (Abnormal) Collected:  08/13/20 1253    Lab Status:  Final result Specimen:  Swab from Nasopharynx Updated:  08/13/20 1416     ADENOVIRUS, PCR Not Detected     Coronavirus 229E Not Detected     Coronavirus HKU1 Not Detected     Coronavirus NL63 Not Detected     Coronavirus OC43 Not Detected     COVID19 Detected     Human Metapneumovirus Not Detected     Human Rhinovirus/Enterovirus Not Detected     Influenza A PCR Not Detected     Influenza A H1 Not Detected     Influenza A H1 2009 PCR Not Detected     Influenza A H3 Not Detected     Influenza B PCR Not Detected     Parainfluenza Virus 1 Not Detected     Parainfluenza Virus 2 Not Detected     Parainfluenza Virus 3 Not Detected     Parainfluenza Virus 4 Not Detected     RSV, PCR Not Detected     Bordetella pertussis pcr Not Detected     Bordetella parapertussis PCR Not Detected     Chlamydophila pneumoniae PCR Not Detected     Mycoplasma pneumo by PCR Not Detected    Narrative:       Fact sheet for providers: https://docs.[employer]/wp-content/uploads/RKA9385-7972-AH8.1-EUA-Provider-Fact-Sheet-3.pdf    Fact sheet for patients: https://docs.[employer]/wp-content/uploads/YMM9544-6994-AU6.1-EUA-Patient-Fact-Sheet-1.pdf          IMAGING & OTHER STUDIES    Imaging Results (Last 72 Hours)     ** No results found for the last 72 hours. **                ASSESSMENT/PLAN:      Acute respiratory failure with hypoxia and hypercapnia (CMS/HCC)    COVID-19    Ascending aortic aneurysm (CMS/HCC)    Essential hypertension    Lymphedema    Vitamin D deficiency    Polyneuropathy    Bipolar 1 disorder (CMS/HCC)    Shortness of breath    Obesity, morbid, BMI 50 or higher (CMS/HCC)  suspected NIGEL/OHS    PLAN:  Wean FiO2 as tolerated.  Now down to 6L HFNC. Titrate for sats above 90%.      He likely has acute on chronic hypercapnic respiratory failure likely from his obesity hypoventilation syndrome.  We will continue with noninvasive ventilation.  BiPAP was ineffective so he was switched to AVAPS settings. Recommend Sleep study as OP vs trilogy. He is encouraged to use AVAPS every night and as needed during during daytime.    His CT scan of the chest is concerning for pulmonary hypertension.    -Will need further work-up for this pulmonary pretension but likely undiagnosed obesity hypoventilation syndrome and obstructive sleep apnea is playing a role.  Is probably an element of group 2 diastolic heart failure.      Diuresis as appropriate  Continue dexamethasone daily for total of 10 days  Not a candidate for Remdesivir due to length of infection  Continue Proventil and Symbicort  He is on Lovenox for DVT prophylaxis.     Will follow    THIS DOCUMENT HAS BEEN ELECTRONICALLY SIGNED BY  Lino Forrester MD  11:59 AM

## 2020-08-18 NOTE — PLAN OF CARE
Patient resting abed, no complaints voiced. He is currently wearing his CPAP and has tolerated it well. He has made slight changes in body position but not wanted to really turn, educated on the need for repositioning. He said that he hopes to be discharged to go back to [facility] soon.

## 2020-08-18 NOTE — PLAN OF CARE
Problem: Patient Care Overview  Goal: Plan of Care Review  Outcome: Ongoing (interventions implemented as appropriate)  Flowsheets (Taken 8/18/2020 8529)  Progress: improving  Plan of Care Reviewed With: patient  Outcome Summary: pt voiced no concerns this shift; remains on O2 at 6L high flow; encourage to turn every 2 hours; will continue to monitor

## 2020-08-18 NOTE — PROGRESS NOTES
[facility] Medicine Services Daily Progress Note      Hospitalist Team  LOS 5 days      Patient Care Team:  Steve Valerio MD as PCP - General (Hospitalist)    Patient Location: 210/1      Subjective   Subjective     Chief Complaint / Subjective  Chief Complaint   Patient presents with   • Shortness of Breath         Brief Synopsis of Hospital Course/HPI       Mr. Ramirez is a 65 y.o. morbidly obese male who was sent to the [facility] ED on 08/13/2020 from [facility] complaining of shortness of breath. The patient is from [place] and recently tested positive for COVID-19. He was sent to [facility] as they have a unit dedicated to COVID positive patients. The patient was reportedly hypoxic overnight with O2 sat mid-80s on room air. He was placed on supplemental oxygen with improvement in his saturation. Upon arrival to the ER here the patient's O2 sat was 91% on 4 liters. His ABG revealed pH 7.308, CO2 81.1, pO2 86.8, HCO3 40.6. He was placed on BiPAP.       CT chest was negative for pulmonary embolism. He was also noted have an ascending aortic aneurysm measuring 4.7 cm. His EKG showed sinus rhythm. He was admitted for further evaluation and treatment.      Admitted for acute hypoxic hypercapnic respiratory failure    Date:: 8/18/2020  Patient seen at bedside today, he still requiring high flow oxygen.  He denies any chest pain he does state that his respiratory symptoms are slightly improved.  He remains afebrile        Review of Systems   Constitution: Negative.   HENT: Negative.    Eyes: Negative.    Cardiovascular: Negative.    Respiratory: Positive for cough, shortness of breath and sputum production.    Endocrine: Negative.    Skin: Negative.    Musculoskeletal: Negative.    Gastrointestinal: Negative.    Genitourinary: Negative.    Neurological: Negative.    Psychiatric/Behavioral: Negative.          Objective   Objective      Vital  "Signs  Temp:  [94.1 °F (34.5 °C)-98 °F (36.7 °C)] 98 °F (36.7 °C)  Heart Rate:  [53-83] 53  Resp:  [13-20] 20  BP: ()/(51-84) 135/84  Oxygen Therapy  SpO2: 93 %  Pulse Oximetry Type: Continuous  Device (Oxygen Therapy): nasal cannula  Device (Oxygen Therapy): high-flow nasal cannula  Flow (L/min): 6  Oxygen Concentration (%): 44  Flowsheet Rows      First Filed Value   Admission Height  185.4 cm (73\") Documented at 08/13/2020 0307   Admission Weight  (!) 174 kg (384 lb) [[facility] ] Documented at 08/13/2020 0307        Intake & Output (last 3 days)       08/15 0701 - 08/16 0700 08/16 0701 - 08/17 0700 08/17 0701 - 08/18 0700 08/18 0701 - 08/19 0700    P.O. 2809 204 7230     Total Intake(mL/kg) 1560 (8.8) 600 (4.8) 1200 (7)     Urine (mL/kg/hr) 600 (0.1) 900 (0.3) 1500 (0.4)     Stool   0     Total Output      Net +960 -300 -300             Urine Unmeasured Occurrence  1 x 2 x     Stool Unmeasured Occurrence   1 x         Lines, Drains & Airways    Active LDAs     Name:   Placement date:   Placement time:   Site:   Days:    Peripheral IV 08/13/20 0318 Left Antecubital   08/13/20 0318    Antecubital   5                  Physical Exam:    Physical Exam   Constitutional: He is oriented to person, place, and time. He appears well-developed and well-nourished.   HENT:   Head: Normocephalic and atraumatic.   Eyes: Pupils are equal, round, and reactive to light. EOM are normal.   Neck: Normal range of motion. Neck supple.   Cardiovascular: Normal rate, regular rhythm, normal heart sounds and intact distal pulses.   Pulmonary/Chest:   Decrease  Breath   Sounds  bilat   Abdominal: Soft. Bowel sounds are normal.   Musculoskeletal: Normal range of motion.   Neurological: He is alert and oriented to person, place, and time.   Skin: Skin is warm and dry.   Nursing note and vitals reviewed.           Wounds (last 24 hours)      LDA Wound     Row Name 08/18/20 0340 08/17/20 " 2322 08/17/20 1953       Wound 08/13/20 0845 coccyx     Wound - Properties Group Date first assessed: 08/13/20 -AF Time first assessed: 0845 -AF Location: coccyx  -AF Primary Wound Type: --  -AF, small open spot, unable to take photo, zinc ordered, wocn consulted     Dressing Appearance  --  -JA  open to air  -JA  open to air  -VS    Closure  --  -JA  Open to air  -JA  Open to air  -VS    Base  --  -JA  blanchable;dry;pink  -JA  blanchable;dry;pink  -VS    Periwound  --  -JA  intact;dry;blanchable  -JA  intact;dry;blanchable  -VS    Periwound Temperature  --  --  warm  -VS    Drainage Amount  --  --  none  -VS    Care, Wound  --  --  -- zinc oxide applied   -VS    Dressing Care, Wound  --  --  -- zinc oxide applied   -VS    Row Name 08/17/20 0742             Wound 08/13/20 0845 coccyx     Wound - Properties Group Date first assessed: 08/13/20 -AF Time first assessed: 0845 -AF Location: coccyx  -AF Primary Wound Type: --  -AF, small open spot, unable to take photo, zinc ordered, wocn consulted     Dressing Appearance  open to air  -VS      Closure  Open to air  -VS      Base  blanchable;dry;pink  -VS      Periwound  intact;dry;blanchable  -VS      Periwound Temperature  warm  -VS      Drainage Amount  none  -VS      Care, Wound  -- zinc oxide applied   -VS      Dressing Care, Wound  -- zinc oxide applied   -VS        User Key  (r) = Recorded By, (t) = Taken By, (c) = Cosigned By    Initials Name Provider Type    AF Yovana Awan, RN Registered Nurse    VS Sturgeon, Valerie, LPN Licensed Nurse    Marycruz Ugalde LPN Licensed Nurse              Results Review:     I reviewed the patient's new clinical results.      Lab Results (last 24 hours)     Procedure Component Value Units Date/Time    POC Glucose Once [[id number]]  (Abnormal) Collected:  08/18/20 0706    Specimen:  Blood Updated:  08/18/20 0708     Glucose 107 mg/dL      Comment: Serial Number: [id number]:  [id number]       BUN [[id number]]   (Normal) Collected:  08/17/20 1318    Specimen:  Blood Updated:  08/17/20 2012     BUN 22 mg/dL     POC Glucose Once [[id number]]  (Abnormal) Collected:  08/17/20 1949    Specimen:  Blood Updated:  08/17/20 1950     Glucose 148 mg/dL      Comment: Serial Number: [id number]:  [id number]       POC Glucose Once [[id number]]  (Abnormal) Collected:  08/17/20 1629    Specimen:  Blood Updated:  08/17/20 1631     Glucose 195 mg/dL      Comment: Serial Number: [id number]:  [id number]       Basic Metabolic Panel [[id number]]  (Abnormal) Collected:  08/17/20 1318    Specimen:  Blood Updated:  08/17/20 1434     Glucose 174 mg/dL      BUN --     Comment: Testing performed by alternate method        Creatinine 0.72 mg/dL      Sodium 134 mmol/L      Potassium 4.8 mmol/L      Comment: Specimen hemolyzed.  Results may be affected.        Chloride 95 mmol/L      CO2 32.0 mmol/L      Calcium 8.8 mg/dL      eGFR Non African Amer 110 mL/min/1.73      BUN/Creatinine Ratio --     Comment: Testing not performed        Anion Gap 7.0 mmol/L     Narrative:       GFR Normal >60  Chronic Kidney Disease <60  Kidney Failure <15      CBC & Differential [[id number]] Collected:  08/17/20 1318    Specimen:  Blood Updated:  08/17/20 1408    Narrative:       The following orders were created for panel order CBC & Differential.  Procedure                               Abnormality         Status                     ---------                               -----------         ------                     CBC Auto Differential[[id number]]        Abnormal            Final result                 Please view results for these tests on the individual orders.    CBC Auto Differential [[id number]]  (Abnormal) Collected:  08/17/20 1318    Specimen:  Blood Updated:  08/17/20 1408     WBC 7.40 10*3/mm3      RBC 4.95 10*6/mm3      Hemoglobin 14.9 g/dL      Hematocrit 45.6 %      MCV 92.1 fL      MCH 30.1 pg      MCHC 32.7 g/dL      RDW 13.5 %      RDW-SD 44.2 fl       MPV 10.2 fL      Platelets 167 10*3/mm3      Neutrophil % 86.1 %      Lymphocyte % 10.2 %      Monocyte % 2.7 %      Eosinophil % 0.1 %      Basophil % 0.9 %      Neutrophils, Absolute 6.40 10*3/mm3      Lymphocytes, Absolute 0.80 10*3/mm3      Monocytes, Absolute 0.20 10*3/mm3      Eosinophils, Absolute 0.00 10*3/mm3      Basophils, Absolute 0.10 10*3/mm3      nRBC 0.1 /100 WBC     POC Glucose Once [[id number]]  (Abnormal) Collected:  08/17/20 1253    Specimen:  Blood Updated:  08/17/20 1255     Glucose 171 mg/dL      Comment: Serial Number: [id number]:  [id number]       POC Glucose Once [[id number]]  (Abnormal) Collected:  08/17/20 0810    Specimen:  Blood Updated:  08/17/20 0811     Glucose 124 mg/dL      Comment: Serial Number: [id number]:  [id number]           No results found for: HGBA1C  Results from last 7 days   Lab Units 08/13/20  0318   INR  1.00       Results from last 7 days   Lab Units 08/15/20  0347   PH, ARTERIAL pH units 7.368   PO2 ART mm Hg 97.2   PCO2, ARTERIAL mm Hg 67.2*   HCO3 ART mmol/L 38.7*     No results found for: LIPASE  No results found for: CHOL, CHLPL, TRIG, HDL, LDL, LDLDIRECT    No results found for: INTRAOP, PREDX, FINALDX, COMDX    Microbiology Results (last 10 days)     Procedure Component Value - Date/Time    S. Pneumo Ag Urine or CSF - Urine, Urine, Clean Catch [[id number]]  (Normal) Collected:  08/13/20 1810    Lab Status:  Final result Specimen:  Urine, Clean Catch Updated:  08/13/20 1856     Strep Pneumo Ag Negative    Legionella Antigen, Urine - Urine, Urine, Clean Catch [[id number]]  (Normal) Collected:  08/13/20 1810    Lab Status:  Final result Specimen:  Urine, Clean Catch Updated:  08/13/20 1855     LEGIONELLA ANTIGEN, URINE Negative    Respiratory Panel PCR w/COVID-19(SARS-CoV-2) IMMANUEL/JESSICA/JOSEMANUEL/PAD In-House, NP Swab in UTM/VTM, 3-4 HR TAT - Swab, Nasopharynx [[id number]]  (Abnormal) Collected:  08/13/20 1253    Lab Status:  Final result Specimen:  Swab from  Nasopharynx Updated:  08/13/20 1416     ADENOVIRUS, PCR Not Detected     Coronavirus 229E Not Detected     Coronavirus HKU1 Not Detected     Coronavirus NL63 Not Detected     Coronavirus OC43 Not Detected     COVID19 Detected     Human Metapneumovirus Not Detected     Human Rhinovirus/Enterovirus Not Detected     Influenza A PCR Not Detected     Influenza A H1 Not Detected     Influenza A H1 2009 PCR Not Detected     Influenza A H3 Not Detected     Influenza B PCR Not Detected     Parainfluenza Virus 1 Not Detected     Parainfluenza Virus 2 Not Detected     Parainfluenza Virus 3 Not Detected     Parainfluenza Virus 4 Not Detected     RSV, PCR Not Detected     Bordetella pertussis pcr Not Detected     Bordetella parapertussis PCR Not Detected     Chlamydophila pneumoniae PCR Not Detected     Mycoplasma pneumo by PCR Not Detected    Narrative:       Fact sheet for providers: https://docs.docTrackr/wp-content/uploads/APH2697-6052-TI4.1-EUA-Provider-Fact-Sheet-3.pdf    Fact sheet for patients: https://docs.docTrackr/wp-content/uploads/TIL4857-4005-GY6.1-EUA-Patient-Fact-Sheet-1.pdf          ECG/EMG Results (most recent)     Procedure Component Value Units Date/Time    ECG 12 Lead [[id number]] Collected:  08/13/20 0302     Updated:  08/14/20 1708    Narrative:       HEART RATE= 79  bpm  RR Interval= 760  ms  AZ Interval= 192  ms  P Horizontal Axis= 10  deg  P Front Axis= 42  deg  QRSD Interval= 126  ms  QT Interval= 384  ms  QRS Axis= -55  deg  T Wave Axis= 101  deg  - ABNORMAL ECG -  Sinus rhythm  Nonspecific IVCD with LAD  Inferior infarct, old  Nonspecific T abnormalities, lateral leads  No previous ECG available for comparison  Electronically Signed By: Davi Hinojosa (Babatunde) 14-Aug-2020 16:57:21  Date and Time of Study: 2020-08-13 03:02:12                    Ct Chest Pulmonary Embolism    Result Date: 8/13/2020  1. No evidence of pulmonary embolism. No evidence of an acute intrathoracic process. 2. Borderline  enlarged right hilar lymph nodes, nonspecific. 3. Fusiform ascending aortic aneurysm measuring 4.7 cm in diameter. 4. Enlarged pulmonary trunk. This can be seen in the setting of pulmonary arterial hypertension. 5. Coronary artery atherosclerosis. 6. Nonobstructing stones in each kidney. Electronically signed by:  Davey Boo M.D.  8/13/2020 2:51 AM          Xrays, labs reviewed personally by physician.    Medication Review:   I have reviewed the patient's current medication list      Scheduled Meds    albuterol sulfate HFA 2 puff Inhalation 4x Daily - RT   amLODIPine 10 mg Oral Daily   budesonide-formoterol 2 puff Inhalation BID - RT   celecoxib 100 mg Oral BID   dexamethasone 6 mg Intravenous Daily   divalproex 750 mg Oral TID   enoxaparin 40 mg Subcutaneous Q12H   furosemide 20 mg Oral Daily   gabapentin 600 mg Oral Q8H   insulin lispro 0-9 Units Subcutaneous 4x Daily With Meals & Nightly   lisinopril 40 mg Oral Q24H   QUEtiapine 200 mg Oral BID   QUEtiapine  mg Oral Nightly   sodium chloride 10 mL Intravenous Q12H   zinc oxide  Topical Q12H       Meds Infusions       Meds PRN  •  acetaminophen **OR** acetaminophen **OR** acetaminophen  •  albuterol sulfate HFA  •  aluminum-magnesium hydroxide-simethicone  •  bisacodyl  •  cyclobenzaprine  •  dextrose  •  dextrose  •  glucagon (human recombinant)  •  hydrALAZINE  •  insulin lispro **AND** insulin lispro  •  magnesium hydroxide  •  magnesium sulfate **OR** magnesium sulfate in D5W 1g/100mL (PREMIX)  •  melatonin  •  nitroglycerin  •  ondansetron **OR** ondansetron  •  potassium chloride **OR** potassium chloride **OR** potassium chloride  •  sodium chloride        Assessment/Plan   Assessment/Plan     Active Hospital Problems    Diagnosis  POA   • **Acute respiratory failure with hypoxia and hypercapnia (CMS/HCC) [J96.01, J96.02]  Yes   • COVID-19 [U07.1]  Yes   • Ascending aortic aneurysm (CMS/HCC) [I71.2]  Yes   • Essential hypertension [I10]  Yes   •  Lymphedema [I89.0]  Yes   • Vitamin D deficiency [E55.9]  Yes   • Polyneuropathy [G62.9]  Yes   • Bipolar 1 disorder (CMS/formerly [facility]) [F31.9]  Yes   • Shortness of breath [R06.02]  Yes   • Obesity, morbid, BMI 50 or higher (CMS/HCC) [E66.01]  Yes      Resolved Hospital Problems   No resolved problems to display.       MEDICAL DECISION MAKING COMPLEXITY BY PROBLEM:       Assessment  Acute hypoxic hypercapnic respiratory failure secondary to COVID-19 infection  PE has been ruled out by CTA  CT concerning for pulmonary hypertension, will defer management to pulmonologist  Significant consideration for obesity hypoventilation syndrome and obstructive sleep apnea, defer management to pulmonologist  Pulmonary services appreciated  Continue O2 currently the patient is on high flow oxygen at 7 L, we will monitor him daily and titrate  Continue dexamethasone  Continue albuterol and Symbicort  So far strep and Legionella is unremarkable  Maintain isolation  Continue Lasix 20 mg daily and monitor renal function very closely  Not a candidate for Remdesir  due to length of infection  Continue with noninvasive ventilation.  BiPAP was ineffective so he was switched to AVAPS settings.   Sleep study as OP vs trilogy.  AVAPS every night and as needed during during daytime.        COVID 19- Infection   previously diagnosed   continue supplemental oxygen and steroids  not a candidate for Remdesivir     Ascending aortic aneurysm  incidental finding on CT scan, measures 4.7 cm  referral to vascular surgery once COVID negative     Essential hypertension, chronic, controlled  amlodipine, lisinopril and lasix    monitor with routine VS and make adjustments as needed      Lymphedema  Cont po lasix      Vitamin D deficiency  Resume supplement      Polyneuropathy  continue Celebrex, Flexeril and gabapentin     Bipolar 1 disorder   Depakote and Seroquel        Morbid obesity    on dietary changes      DVT Prophylaxis  enoxaparin         VTE  Prophylaxis -   Mechanical Order History:     None      Pharmalogical Order History:     Ordered     Dose Route Frequency Stop    08/14/20 1420  enoxaparin (LOVENOX) syringe 40 mg      40 mg SC Every 12 Hours --    08/13/20 0734  enoxaparin (LOVENOX) syringe 90 mg  Status:  Discontinued      0.5 mg/kg SC Every 12 Hours 08/14/20 1420            Code Status -   Code Status and Medical Interventions:   Ordered at: 08/13/20 0734     Code Status:    CPR     Medical Interventions (Level of Support Prior to Arrest):    Full       This patient has been examined wearing appropriate Personal Protective Equipment      Discharge Planning  Pending clinical improvement          Electronically signed by Padmaja Schilling MD, 08/18/20, 07:30.  [facility] Hospitalist Team

## 2020-08-19 LAB
ANION GAP SERPL CALCULATED.3IONS-SCNC: 8 MMOL/L (ref 5–15)
BASOPHILS # BLD AUTO: 0.1 10*3/MM3 (ref 0–0.2)
BASOPHILS NFR BLD AUTO: 0.8 % (ref 0–1.5)
BUN SERPL-MCNC: 23 MG/DL (ref 8–23)
BUN SERPL-MCNC: ABNORMAL MG/DL
BUN/CREAT SERPL: ABNORMAL
CALCIUM SPEC-SCNC: 8.8 MG/DL (ref 8.6–10.5)
CHLORIDE SERPL-SCNC: 91 MMOL/L (ref 98–107)
CO2 SERPL-SCNC: 30 MMOL/L (ref 22–29)
CREAT SERPL-MCNC: 0.69 MG/DL (ref 0.76–1.27)
D DIMER PPP FEU-MCNC: <0.19 MG/L (FEU) (ref 0–0.59)
DEPRECATED RDW RBC AUTO: 42.9 FL (ref 37–54)
EOSINOPHIL # BLD AUTO: 0 10*3/MM3 (ref 0–0.4)
EOSINOPHIL NFR BLD AUTO: 0.2 % (ref 0.3–6.2)
ERYTHROCYTE [DISTWIDTH] IN BLOOD BY AUTOMATED COUNT: 13.5 % (ref 12.3–15.4)
FERRITIN SERPL-MCNC: 102.1 NG/ML (ref 30–400)
GFR SERPL CREATININE-BSD FRML MDRD: 115 ML/MIN/1.73
GLUCOSE BLDC GLUCOMTR-MCNC: 120 MG/DL (ref 70–105)
GLUCOSE BLDC GLUCOMTR-MCNC: 122 MG/DL (ref 70–105)
GLUCOSE BLDC GLUCOMTR-MCNC: 144 MG/DL (ref 70–105)
GLUCOSE BLDC GLUCOMTR-MCNC: 239 MG/DL (ref 70–105)
GLUCOSE SERPL-MCNC: 118 MG/DL (ref 65–99)
HCT VFR BLD AUTO: 45.6 % (ref 37.5–51)
HGB BLD-MCNC: 15.2 G/DL (ref 13–17.7)
LYMPHOCYTES # BLD AUTO: 1.4 10*3/MM3 (ref 0.7–3.1)
LYMPHOCYTES NFR BLD AUTO: 16.2 % (ref 19.6–45.3)
MCH RBC QN AUTO: 30.6 PG (ref 26.6–33)
MCHC RBC AUTO-ENTMCNC: 33.4 G/DL (ref 31.5–35.7)
MCV RBC AUTO: 91.7 FL (ref 79–97)
MONOCYTES # BLD AUTO: 0.5 10*3/MM3 (ref 0.1–0.9)
MONOCYTES NFR BLD AUTO: 5.9 % (ref 5–12)
NEUTROPHILS NFR BLD AUTO: 6.7 10*3/MM3 (ref 1.7–7)
NEUTROPHILS NFR BLD AUTO: 76.9 % (ref 42.7–76)
NRBC BLD AUTO-RTO: 0.2 /100 WBC (ref 0–0.2)
PLATELET # BLD AUTO: 162 10*3/MM3 (ref 140–450)
PMV BLD AUTO: 10.3 FL (ref 6–12)
POTASSIUM SERPL-SCNC: 4.8 MMOL/L (ref 3.5–5.2)
RBC # BLD AUTO: 4.97 10*6/MM3 (ref 4.14–5.8)
SODIUM SERPL-SCNC: 129 MMOL/L (ref 136–145)
WBC # BLD AUTO: 8.7 10*3/MM3 (ref 3.4–10.8)

## 2020-08-19 PROCEDURE — 97535 SELF CARE MNGMENT TRAINING: CPT

## 2020-08-19 PROCEDURE — 85379 FIBRIN DEGRADATION QUANT: CPT | Performed by: INTERNAL MEDICINE

## 2020-08-19 PROCEDURE — 99232 SBSQ HOSP IP/OBS MODERATE 35: CPT | Performed by: INTERNAL MEDICINE

## 2020-08-19 PROCEDURE — 63710000001 INSULIN LISPRO (HUMAN) PER 5 UNITS: Performed by: INTERNAL MEDICINE

## 2020-08-19 PROCEDURE — 94799 UNLISTED PULMONARY SVC/PX: CPT

## 2020-08-19 PROCEDURE — 82728 ASSAY OF FERRITIN: CPT | Performed by: INTERNAL MEDICINE

## 2020-08-19 PROCEDURE — 85025 COMPLETE CBC W/AUTO DIFF WBC: CPT | Performed by: INTERNAL MEDICINE

## 2020-08-19 PROCEDURE — 80048 BASIC METABOLIC PNL TOTAL CA: CPT | Performed by: INTERNAL MEDICINE

## 2020-08-19 PROCEDURE — 97162 PT EVAL MOD COMPLEX 30 MIN: CPT

## 2020-08-19 PROCEDURE — 25010000002 ENOXAPARIN PER 10 MG: Performed by: INTERNAL MEDICINE

## 2020-08-19 PROCEDURE — 82962 GLUCOSE BLOOD TEST: CPT

## 2020-08-19 PROCEDURE — 97530 THERAPEUTIC ACTIVITIES: CPT

## 2020-08-19 PROCEDURE — 97167 OT EVAL HIGH COMPLEX 60 MIN: CPT

## 2020-08-19 RX ADMIN — AMLODIPINE BESYLATE 10 MG: 5 TABLET ORAL at 08:54

## 2020-08-19 RX ADMIN — BUDESONIDE AND FORMOTEROL FUMARATE DIHYDRATE 2 PUFF: 160; 4.5 AEROSOL RESPIRATORY (INHALATION) at 07:03

## 2020-08-19 RX ADMIN — ALBUTEROL SULFATE 2 PUFF: 90 AEROSOL, METERED RESPIRATORY (INHALATION) at 19:14

## 2020-08-19 RX ADMIN — ZINC OXIDE: 200 OINTMENT TOPICAL at 08:54

## 2020-08-19 RX ADMIN — DIVALPROEX SODIUM 750 MG: 250 TABLET, DELAYED RELEASE ORAL at 16:22

## 2020-08-19 RX ADMIN — ALBUTEROL SULFATE 2 PUFF: 90 AEROSOL, METERED RESPIRATORY (INHALATION) at 07:03

## 2020-08-19 RX ADMIN — ALBUTEROL SULFATE 2 PUFF: 90 AEROSOL, METERED RESPIRATORY (INHALATION) at 14:55

## 2020-08-19 RX ADMIN — Medication 5000 UNITS: at 08:53

## 2020-08-19 RX ADMIN — GABAPENTIN 600 MG: 300 CAPSULE ORAL at 21:19

## 2020-08-19 RX ADMIN — GABAPENTIN 600 MG: 300 CAPSULE ORAL at 14:53

## 2020-08-19 RX ADMIN — Medication 10 ML: at 08:54

## 2020-08-19 RX ADMIN — FUROSEMIDE 20 MG: 20 TABLET ORAL at 08:54

## 2020-08-19 RX ADMIN — ZINC OXIDE: 200 OINTMENT TOPICAL at 21:20

## 2020-08-19 RX ADMIN — ALBUTEROL SULFATE 2 PUFF: 90 AEROSOL, METERED RESPIRATORY (INHALATION) at 10:56

## 2020-08-19 RX ADMIN — BUDESONIDE AND FORMOTEROL FUMARATE DIHYDRATE 2 PUFF: 160; 4.5 AEROSOL RESPIRATORY (INHALATION) at 19:14

## 2020-08-19 RX ADMIN — ENOXAPARIN SODIUM 40 MG: 40 INJECTION SUBCUTANEOUS at 08:53

## 2020-08-19 RX ADMIN — DEXAMETHASONE 6 MG: 6 TABLET ORAL at 08:53

## 2020-08-19 RX ADMIN — QUETIAPINE FUMARATE 200 MG: 50 TABLET, EXTENDED RELEASE ORAL at 21:19

## 2020-08-19 RX ADMIN — INSULIN LISPRO 4 UNITS: 100 INJECTION, SOLUTION INTRAVENOUS; SUBCUTANEOUS at 21:18

## 2020-08-19 RX ADMIN — QUETIAPINE 200 MG: 100 TABLET, FILM COATED ORAL at 08:53

## 2020-08-19 RX ADMIN — ENOXAPARIN SODIUM 40 MG: 40 INJECTION SUBCUTANEOUS at 21:20

## 2020-08-19 RX ADMIN — DIVALPROEX SODIUM 750 MG: 250 TABLET, DELAYED RELEASE ORAL at 21:19

## 2020-08-19 RX ADMIN — QUETIAPINE 200 MG: 100 TABLET, FILM COATED ORAL at 21:19

## 2020-08-19 RX ADMIN — CELECOXIB 100 MG: 100 CAPSULE ORAL at 21:19

## 2020-08-19 RX ADMIN — CELECOXIB 100 MG: 100 CAPSULE ORAL at 08:54

## 2020-08-19 RX ADMIN — LISINOPRIL 40 MG: 20 TABLET ORAL at 21:20

## 2020-08-19 RX ADMIN — GABAPENTIN 600 MG: 300 CAPSULE ORAL at 05:20

## 2020-08-19 RX ADMIN — DIVALPROEX SODIUM 750 MG: 250 TABLET, DELAYED RELEASE ORAL at 08:53

## 2020-08-19 RX ADMIN — Medication 10 ML: at 21:18

## 2020-08-19 NOTE — PLAN OF CARE
Problem: Patient Care Overview  Goal: Plan of Care Review  Outcome: Ongoing (interventions implemented as appropriate)  Flowsheets (Taken 8/19/2020 1500)  Outcome Summary: Pt is 64 yo male originally from ECF in [place] who was transferred to [facility] due to COVID19 infection. Pt now hospitalized due to acute respiratory failure with hypoxia. Pt reports at baseline he completes ADLs at bed level, and complete stand>pivot transfer to w/c. Pt in bed upon OT/PT eval, requiring 6L O2 to maintain SpO2. Pt requires Max A x2 for bed mobility, and sits EOB wiht Min A. Pt limited by body habitus, unable to stack abdomen over hips to maintain upright position. Pt unable to reposition LE's for standing due to girth.  Pt impulsive, attempting to pull from objects such as chair, which he slides across floor  While standing, pt attempts to use momentum to swing self into chair while pulling on OT/PT, resulting in near fall. While in chair, pt attempts to rock forward/back to reposition hips, with poor safety awareness. Pt's chair in room is not appropriate for pt's size, and he was returned to bed with Max x2 and max Ax2 for positioning while in bed. SpO2 remained stable, and OT recommend lift for transfers at this time. Pt will require return to IP rehab at discharge. PPE: Gloves, N95, surgical mask, face shield, gown, boy, shoe cover
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
## 2020-08-19 NOTE — THERAPY EVALUATION
Acute Care - Occupational Therapy Initial Evaluation   [facility]     Patient Name: Fox Ramirez  : 1955  MRN: [id number]  Today's Date: 2020             Admit Date: 2020       ICD-10-CM ICD-9-CM   1. COVID-19 U07.1 079.89   2. Acute respiratory failure with hypoxia and hypercapnia (CMS/HCC) J96.01 518.81    J96.02    3. Ascending aortic aneurysm (CMS/HCC) I71.2 441.2     Patient Active Problem List   Diagnosis   • COVID-19   • Acute respiratory failure with hypoxia and hypercapnia (CMS/HCC)   • Ascending aortic aneurysm (CMS/HCC)   • Essential hypertension   • Lymphedema   • Vitamin D deficiency   • Polyneuropathy   • Bipolar 1 disorder (CMS/HCC)   • Shortness of breath   • Obesity, morbid, BMI 50 or higher (CMS/HCC)     Past Medical History:   Diagnosis Date   • Bipolar 1 disorder (CMS/HCC)    • Hypertension    • Lymphedema    • Obesity, morbid, BMI 50 or higher (CMS/HCC) 2020   • Polyneuropathy    • Vitamin D deficiency      History reviewed. No pertinent surgical history.       OT ASSESSMENT FLOWSHEET (last 12 hours)      Occupational Therapy Evaluation     Row Name 20 1500                   OT Evaluation Time/Intention    Subjective Information  complains of;fatigue  -ES        Document Type  evaluation  -ES        Mode of Treatment  co-treatment  -ES        Patient Effort  fair  -ES           General Information    Prior Level of Function  independent:;ADL's;w/c or scooter ADLs from w/c level  -ES        Equipment Currently Used at Home  wheelchair  -ES        Pertinent History of Current Functional Problem  Pt is 64 [facility] [place] who was [facility] to [facility] due to COVID19 infection. Pt now hospitalized due to acute respiratory failure with hypoxia. Pt reports at baseline he completes ADLs at bed level, and complete stand>pivot transfer to w/c.   -ES        Existing Precautions/Restrictions  fall;oxygen therapy device and L/min   -ES           Relationship/Environment    Primary Source of Support/Comfort  nonrelative caregiver  -ES        Lives With  facility resident  -ES        Primary Roles/Responsibilities  disabled   -ES           Resource/Environmental Concerns    Current Living Arrangements  extended care facility  -ES        Resource/Environmental Concerns  none  -ES           Cognitive Assessment/Interventions    Additional Documentation  Cognitive Assessment/Intervention (Group)  -ES           Cognitive Assessment/Intervention- PT/OT    Behavioral Issues (Cognitive)  difficulty managing stress  -ES        Orientation Status (Cognition)  oriented x 4  -ES        Follows Commands (Cognition)  75-90% accuracy;delayed response/completion;increased processing time needed;follows one step commands  -ES        Cognitive Function (Cognitive)  safety deficit  -ES        Safety Deficit (Cognitive)  moderate deficit  -ES           Safety Issues, Functional Mobility    Safety Issues Affecting Function (Mobility)  ability to follow commands;impulsivity;judgment;safety precaution awareness;safety precautions follow-through/compliance;sequencing abilities;friction/shear risk;awareness of need for assistance;insight into deficits/self awareness;positioning of assistive device  -ES        Impairments Affecting Function (Mobility)  balance;cognition;coordination;endurance/activity tolerance;grasp;pain;postural/trunk control;shortness of breath;strength  -ES           Bed Mobility Assessment/Treatment    Bed Mobility Assessment/Treatment  rolling left;rolling right;supine-sit;sit-supine  -ES        Rolling Left [place] (Bed Mobility)  maximum assist (25% patient effort)  -ES        Rolling Right [place] (Bed Mobility)  maximum assist (25% patient effort)  -ES        Supine-Sit [place] (Bed Mobility)  maximum assist (25% patient effort);2 person assist  -ES        Sit-Supine [place] (Bed Mobility)  maximum  assist (25% patient effort);2 person assist  -ES           Transfer Assessment/Treatment    Transfer Assessment/Treatment  stand-sit transfer;sit-stand transfer;bed-chair transfer  -ES        Comment (Transfers)  Pt impulsive, attempting to pull from objects such as chair, which he slides across floor  While standing, pt attempts to use momentum to swing self into chair while pulling on OT/PT, resulting in near fall. While in chair, pt attempts to rock forward/back to reposition hips, with poor safety awareness.   -ES           Bed-Chair Transfer    Bed-Chair [place] (Transfers)  maximum assist (25% patient effort);2 person assist;dependent (less than 25% patient effort)  -ES           Sit-Stand Transfer    Sit-Stand [place] (Transfers)  maximum assist (25% patient effort);2 person assist  -ES           Stand-Sit Transfer    Stand-Sit [place] (Transfers)  maximum assist (25% patient effort);2 person assist  -ES           ADL Assessment/Intervention    BADL Assessment/Intervention  lower body dressing;upper body dressing;toileting  -ES           Upper Body Dressing Assessment/Training    Upper Body Dressing [place] Level  maximum assist (25% patient effort)  -ES           Lower Body Dressing Assessment/Training    Lower Body Dressing [place] Level  dependent (less than 25% patient effort)  -ES           Toileting Assessment/Training    [place] Level (Toileting)  maximum assist (25% patient effort)  -ES           BADL Safety/Performance    Impairments, BADL Safety/Performance  balance;cognition;endurance/activity tolerance;coordination;motor control;motor planning;muscle tone abnormality;shortness of breath;strength;trunk/postural control  -ES        Cognitive Impairments, BADL Safety/Performance  attention;awareness, need for assistance;impulsivity;judgment;insight into deficits/self awareness;safety precaution awareness;safety precaution follow-through  -ES           General ROM     GENERAL ROM COMMENTS  BUE WFL  -ES           MMT (Manual Muscle Testing)    General MMT Comments  BUE 4+/5  -ES           Motor Assessment/Interventions    Additional Documentation  Balance (Group)  -ES           Balance    Balance  static sitting balance;static standing balance;dynamic sitting balance  -ES           Static Sitting Balance    Level of [place] (Unsupported Sitting, Static Balance)  minimal assist, 75% patient effort  -ES        Sitting Position (Unsupported Sitting, Static Balance)  sitting on edge of bed  -ES        Time Able to Maintain Position (Unsupported Sitting, Static Balance)  3 to 4 minutes  -ES        Comment (Unsupported Sitting, Static Balance)  Pt has difficulty with core strength/stability, unable to stack abdomen over hips to balance appropriately  -ES           Dynamic Sitting Balance    Level of [place], Reaches Outside Midline (Sitting, Dynamic Balance)  moderate assist, 50 to 74% patient effort;maximal assist, 25 to 49% patient effort  -ES        Sitting Position, Reaches Outside Midline (Sitting, Dynamic Balance)  sitting on edge of bed  -ES           Static Standing Balance    Level of [place] (Supported Standing, Static Balance)  maximal assist, 25 to 49% patient effort;2 person assist  -ES        Time Able to Maintain Position (Supported Standing, Static Balance)  15 to 30 seconds  -ES           Sensory Assessment/Intervention    Sensory General Assessment  no sensation deficits identified  -ES           Positioning and Restraints    Pre-Treatment Position  in bed  -ES        Post Treatment Position  bed  -ES           Health Promotion    Additional Documentation  Coping (Group)  -ES           Wound 08/13/20 0845 coccyx     Wound - Properties Group Date first assessed: 08/13/20  -AF Time first assessed: 0845  -AF Location: coccyx  -AF Primary Wound Type: --  -AF, small open spot, unable to take photo, zinc ordered, wocn consulted        Coping    Observed  Emotional State  apprehensive  -ES        Verbalized Emotional State  acceptance  -ES           Plan of Care Review    Plan of Care Reviewed With  patient  -ES        Outcome Summary  Pt is 66 yo male originally from [facility] in [place] who was transferred to [facility] due to COVID19 infection. Pt now hospitalized due to acute respiratory failure with hypoxia. Pt reports at baseline he completes ADLs at bed level, and complete stand>pivot transfer to w/c. Pt in bed upon OT/PT eval, requiring 6L O2 to maintain SpO2. Pt requires Max A x2 for bed mobility, and sits EOB wiht Min A. Pt limited by body habitus, unable to stack abdomen over hips to maintain upright position. Pt unable to reposition LE's for standing due to girth.  Pt impulsive, attempting to pull from objects such as chair, which he slides across floor  While standing, pt attempts to use momentum to swing self into chair while pulling on OT/PT, resulting in near fall. While in chair, pt attempts to rock forward/back to reposition hips, with poor safety awareness. Pt's chair in room is not appropriate for pt's size, and he was returned to bed with Max x2 and max Ax2 for positioning while in bed. SpO2 remained stable, and OT recommend lift for transfers at this time. Pt will require return to IP rehab at discharge. PPE: Gloves, N95, surgical mask, face shield, gown, boy, shoe cover  -ES           Clinical Impression (OT)    Rehab Potential (OT Eval)  fair, will monitor progress closely  -ES        Therapy Frequency (OT Eval)  5 times/wk  -ES        Predicted Duration of Therapy Intervention (Therapy Eval)  until discharge  -ES        Anticipated Discharge Disposition (OT)  inpatient rehabilitation facility  -ES           Vital Signs    Pre SpO2 (%)  94  -ES        O2 Delivery Pre Treatment  hi-flow 6L   -ES        Intra SpO2 (%)  91  -ES        O2 Delivery Intra Treatment  hi-flow  -ES        Post SpO2 (%)  93  -ES        O2 Delivery  Post Treatment  hi-flow  -ES        Pre Patient Position  Supine  -ES        Intra Patient Position  Standing  -ES        Post Patient Position  Supine  -ES           OT Goals    Dressing Goal Selection (OT)  dressing, OT goal 1  -ES        Toileting Goal Selection (OT)  toileting, OT goal 1  -ES        Activity Tolerance Goal Selection (OT)  activity tolerance, OT goal 1  -ES        Additional Documentation  Activity Tolerance Goal Selection (OT) (Row)  -ES           Dressing Goal 1 (OT)    Activity/Assistive Device (Dressing Goal 1, OT)  upper body dressing  -ES        [place]/Cues Needed (Dressing Goal 1, OT)  set-up required  -ES        Time Frame (Dressing Goal 1, OT)  2 weeks  -ES           Toileting Goal 1 (OT)    Activity/Device (Toileting Goal 1, OT)  toileting skills, all  -ES        [place] Level/Cues Needed (Toileting Goal 1, OT)  maximum assist (25-49% patient effort)  -ES        Time Frame (Toileting Goal 1, OT)  2 weeks  -ES            Activity Tolerance Goal 1 (OT)    Activity Tolerance Goal 1 (OT)  sitting EOB with SBA  -ES        Activity Level (Endurance Goal 1, OT)  5 min activity;O2 sat >/ equal to 88%  -ES        Time Frame (Activity Tolerance Goal 1, OT)  2 weeks  -ES           Living Environment    Home Accessibility  wheelchair accessible  -ES          User Key  (r) = Recorded By, (t) = Taken By, (c) = Cosigned By    Initials Name Effective Dates    AF Yovana Awan RN 11/09/17 -     ES Melia Bro OT 03/01/19 -          Occupational Therapy Education                 Title: PT OT SLP Therapies (In Progress)     Topic: Occupational Therapy (In Progress)     Point: ADL training (In Progress)     Description:   Instruct learner(s) on proper safety adaptation and remediation techniques during self care or transfers.   Instruct in proper use of assistive devices.              Learning Progress Summary           Patient Acceptance, E,TB, NR by ES at 8/19/2020 7087                    Point: Home exercise program (Not Started)     Description:   Instruct learner(s) on appropriate technique for monitoring, assisting and/or progressing therapeutic exercises/activities.              Learner Progress:   Not documented in this visit.          Point: Precautions (In Progress)     Description:   Instruct learner(s) on prescribed precautions during self-care and functional transfers.              Learning Progress Summary           Patient Acceptance, E,TB, NR by  at 8/19/2020 1613                   Point: Body mechanics (In Progress)     Description:   Instruct learner(s) on proper positioning and spine alignment during self-care, functional mobility activities and/or exercises.              Learning Progress Summary           Patient Acceptance, E,TB, NR by  at 8/19/2020 1613                               User Key     Initials Effective Dates Name Provider Type Discipline     03/01/19 -  Melia Bro OT Occupational Therapist OT                  OT Recommendation and Plan  Outcome Summary/Treatment Plan (OT)  Anticipated Discharge Disposition (OT): inpatient rehabilitation facility  Therapy Frequency (OT Eval): 5 times/wk  Plan of Care Review  Plan of Care Reviewed With: patient  Plan of Care Reviewed With: patient  Outcome Summary: Pt is 66 yo male originally from [facility] in [place] who was transferred to [facility] due to COVID19 infection. Pt now hospitalized due to acute respiratory failure with hypoxia. Pt reports at baseline he completes ADLs at bed level, and complete stand>pivot transfer to w/c. Pt in bed upon OT/PT eval, requiring 6L O2 to maintain SpO2. Pt requires Max A x2 for bed mobility, and sits EOB wiht Min A. Pt limited by body habitus, unable to stack abdomen over hips to maintain upright position. Pt unable to reposition LE's for standing due to girth.  Pt impulsive, attempting to pull from objects such as chair, which he slides across floor   While standing, pt attempts to use momentum to swing self into chair while pulling on OT/PT, resulting in near fall. While in chair, pt attempts to rock forward/back to reposition hips, with poor safety awareness. Pt's chair in room is not appropriate for pt's size, and he was returned to bed with Max x2 and max Ax2 for positioning while in bed. SpO2 remained stable, and OT recommend lift for transfers at this time. Pt will require return to IP rehab at discharge. PPE: Gloves, N95, surgical mask, face shield, gown, boy, shoe cover        Time Calculation:   Time Calculation- OT     Row Name 08/19/20 1615             Time Calculation- OT    OT Start Time  1500  -ES      OT Stop Time  1530  -ES      OT Time Calculation (min)  30 min  -ES      Total Timed Code Minutes- OT  10 minute(s)  -ES      OT Non-Billable Time (min)  20 min  -ES      OT Received On  08/19/20  -ES      OT - Next Appointment  08/20/20  -ES      OT Goal Re-Cert Due Date  09/02/20  -ES        User Key  (r) = Recorded By, (t) = Taken By, (c) = Cosigned By    Initials Name Provider Type    ES Melia Bro OT Occupational Therapist        Therapy Charges for Today     Code Description Service Date Service Provider Modifiers Qty    26912288366 HC OT SELF CARE/MGMT/TRAIN EA 15 MIN 8/19/2020 Melia Bro OT GO 1    34943761851 HC OT EVAL HIGH COMPLEXITY 4 8/19/2020 Melia Bro OT GO 1               Melia Bro OT  8/19/2020

## 2020-08-19 NOTE — PROGRESS NOTES
KPA/PULM/CC PROGRESS NOTE    65 y.o. male who was referred for consultation for shortness of breath and respiratory failure.  Patient is a morbidly obese male with remote history of tobacco use but he denies any previous history of COPD or emphysema.  Patient was recently diagnosed with COVID-19 infection and in [place] a few weeks ago.  He subsequently was transferred to a local nursing home.  Patient was noted to have some low O2 sats yesterday.  He was placed on 3 L nasal cannula and was subsequently transferred to the ER for further evaluation.  Patient was evaluated in the ER.  He required BiPAP therapy and was admitted to the hospital.  I have been asked to see him for further evaluation.  At time of my evaluation patient awakens easily.  He does complain of some shortness of breath.  He does complain of some cough.  He does complain of some nausea and diarrhea.  He denies any chest pain or palpitations.  He denies any vomiting.  His shortness of breath gets worse with any activity.  It gets better with resting and use of supplemental oxygen   SUBJECTIVE    8/14: Awake.  Remains on noninvasive ventilation.  More awake and appropriate today.  Breathing some better.  No complaints of chest pain.  No nausea or vomiting  8/15: Currently tolerating 15 L high flow.  Afebrile overnight.  Blood pressure stable  8/16: No acute events overnight.  Currently on 8 L high flow cannula.  No nausea vomiting.  Afebrile.  8/17: Remains Afebrile. O2 requirement stable at 8L. Did not use the CPAP last night  8/18: Shortness of breath is stable.  Oxygen requirement down to 6 L.  Did use CPAP last night some however did not like it    8/19: Feels better. O2 better    OBJECTIVE    Vitals:    08/18/20 2300 08/19/20 0401 08/19/20 0703 08/19/20 0743   BP: 128/72 109/69  120/65   BP Location: Right arm Right arm  Right arm   Patient Position: Lying Lying  Lying   Pulse: 71 58 59 61   Resp: 18 17 17 24   Temp: 96.6 °F (35.9 °C) 96.9  °F (36.1 °C)  96.6 °F (35.9 °C)   TempSrc: Oral Oral  Oral   SpO2: 94% 95% 95% 93%   Weight:  (!) 172 kg (378 lb 5 oz)     Height:          Intake/Output last 3 shifts:  I/O last 3 completed shifts:  In: 720 [P.O.:720]  Out: 2900 [Urine:2900]  Intake/Output this shift:  I/O this shift:  In: -   Out: 500 [Urine:500]    Constitutional: Morbidly obese, chronically ill-appearing, no acute distress, non-toxic appearance   Eyes:   conjunctiva normal   HENT:  Atraumatic, external ears normal, nose normal  Neck- normal range of motion, no tenderness, supple   Respiratory:  No respiratory distress, normal breath sounds, no rales, no wheezing   Cardiovascular:  Normal rate, normal rhythm, no murmurs, no gallops, no rubs   GI:  Soft, nondistended, normal bowel sounds, nontender, no rebound, no guarding   Musculoskeletal:  + edema, no tenderness, no deformities.  Integument:  Well hydrated, no rash   Neurologic: Awake, CN 2-12 normal, normal motor function, normal sensory function, no focal deficits noted   Psychiatric:  Speech and behavior appropriate     Scheduled Meds:    albuterol sulfate HFA 2 puff Inhalation 4x Daily - RT   amLODIPine 10 mg Oral Daily   budesonide-formoterol 2 puff Inhalation BID - RT   celecoxib 100 mg Oral BID   dexamethasone 6 mg Oral Daily With Breakfast   divalproex 750 mg Oral TID   enoxaparin 40 mg Subcutaneous Q12H   furosemide 20 mg Oral Daily   gabapentin 600 mg Oral Q8H   insulin lispro 0-9 Units Subcutaneous 4x Daily With Meals & Nightly   lisinopril 40 mg Oral Q24H   QUEtiapine 200 mg Oral BID   QUEtiapine  mg Oral Nightly   sodium chloride 10 mL Intravenous Q12H   vitamin D3 5,000 Units Oral Daily   zinc oxide  Topical Q12H       Continuous Infusions:       PRN Meds:•  acetaminophen **OR** acetaminophen **OR** acetaminophen  •  albuterol sulfate HFA  •  aluminum-magnesium hydroxide-simethicone  •  bisacodyl  •  cyclobenzaprine  •  dextrose  •  dextrose  •  glucagon (human  recombinant)  •  hydrALAZINE  •  insulin lispro **AND** insulin lispro  •  magnesium hydroxide  •  magnesium sulfate **OR** magnesium sulfate in D5W 1g/100mL (PREMIX)  •  melatonin  •  nitroglycerin  •  ondansetron **OR** ondansetron  •  potassium chloride **OR** potassium chloride **OR** potassium chloride  •  sodium chloride     LABS    CBC  Results from last 7 days   Lab Units 08/18/20  0719 08/17/20  1318 08/15/20  0315 08/14/20  0716 08/13/20  0318   WBC 10*3/mm3 9.20 7.40 7.70 8.30 5.70   RBC 10*6/mm3 5.24 4.95 4.74 5.15 5.12   HEMOGLOBIN g/dL 15.6 14.9 14.5 15.4 15.4   HEMATOCRIT % 47.8 45.6 44.3 47.3 47.5   MCV fL 91.3 92.1 93.4 91.7 92.7   PLATELETS 10*3/mm3 127* 167 207 115* 226       CMP   Results from last 7 days   Lab Units 08/18/20  1036 08/17/20  1318 08/16/20  0524 08/15/20  0315 08/14/20 0716 08/13/20  0318   SODIUM mmol/L 130* 134*  --  138 138 141   POTASSIUM mmol/L 4.3 4.8 4.6 4.4 4.4 4.6   CHLORIDE mmol/L 92* 95*  --  96* 97* 99   CO2 mmol/L 28.0 32.0*  --  33.0* 34.0* 36.0*   BUN  22 22  --  23 22 17   CREATININE mg/dL 0.69* 0.72*  --  0.71* 0.71* 0.78   GLUCOSE mg/dL 169* 174*  --  119* 106* 97   ALBUMIN g/dL  --   --   --  3.50 3.50 3.50   BILIRUBIN mg/dL  --   --   --  0.4 0.5 0.4   ALK PHOS U/L  --   --   --  37* 42 40   AST (SGOT) U/L  --   --   --  30 26 19   ALT (SGPT) U/L  --   --   --  30 26 21       TROPONIN  Results from last 7 days   Lab Units 08/16/20  0524  08/13/20  0318   CK TOTAL U/L 29   < > 45   TROPONIN T ng/mL  --   --  <0.010    < > = values in this interval not displayed.       CoAg  Results from last 7 days   Lab Units 08/13/20  0318   INR  1.00   APTT seconds 29.3       ABG  Results from last 7 days   Lab Units 08/15/20  0347 08/14/20  1140 08/13/20  0815 08/13/20  0343   PH, ARTERIAL pH units 7.368 7.369 7.285* 7.308*   PCO2, ARTERIAL mm Hg 67.2* 70.5* 82.7* 81.1*   PO2 ART mm Hg 97.2 87.2 94.8 86.8   O2 SATURATION ART % 97.0 95.8 95.7 94.8   BASE EXCESS ART mmol/L  9.8* 10.8* 8.1* 9.6*       Microbiology  Microbiology Results (last 10 days)     Procedure Component Value - Date/Time    S. Pneumo Ag Urine or CSF - Urine, Urine, Clean Catch [[id number]]  (Normal) Collected:  08/13/20 1810    Lab Status:  Final result Specimen:  Urine, Clean Catch Updated:  08/13/20 1856     Strep Pneumo Ag Negative    Legionella Antigen, Urine - Urine, Urine, Clean Catch [[id number]]  (Normal) Collected:  08/13/20 1810    Lab Status:  Final result Specimen:  Urine, Clean Catch Updated:  08/13/20 1855     LEGIONELLA ANTIGEN, URINE Negative    Respiratory Panel PCR w/COVID-19(SARS-CoV-2) [facility]/JESSICA/[facility]SEMANUEL/[facility] In-House, NP Swab in UTM/VTM, 3-4 HR TAT - Swab, Nasopharynx [[id number]]  (Abnormal) Collected:  08/13/20 1253    Lab Status:  Final result Specimen:  Swab from Nasopharynx Updated:  08/13/20 1416     ADENOVIRUS, PCR Not Detected     Coronavirus 229E Not Detected     Coronavirus HKU1 Not Detected     Coronavirus NL63 Not Detected     Coronavirus OC43 Not Detected     COVID19 Detected     Human Metapneumovirus Not Detected     Human Rhinovirus/Enterovirus Not Detected     Influenza A PCR Not Detected     Influenza A H1 Not Detected     Influenza A H1 2009 PCR Not Detected     Influenza A H3 Not Detected     Influenza B PCR Not Detected     Parainfluenza Virus 1 Not Detected     Parainfluenza Virus 2 Not Detected     Parainfluenza Virus 3 Not Detected     Parainfluenza Virus 4 Not Detected     RSV, PCR Not Detected     Bordetella pertussis pcr Not Detected     Bordetella parapertussis PCR Not Detected     Chlamydophila pneumoniae PCR Not Detected     Mycoplasma pneumo by PCR Not Detected    Narrative:       Fact sheet for providers: https://docs.[employer]/wp-content/uploads/QTB6322-3059-IG2.1-EUA-Provider-Fact-Sheet-3.pdf    Fact sheet for patients: https://docs.[employer]/wp-content/uploads/NVX7250-7440-FZ6.1-EUA-Patient-Fact-Sheet-1.pdf          IMAGING & OTHER STUDIES    Imaging Results (Last  72 Hours)     ** No results found for the last 72 hours. **                ASSESSMENT/PLAN:     Acute respiratory failure with hypoxia and hypercapnia (CMS/HCC)    COVID-19    Ascending aortic aneurysm (CMS/HCC)    Essential hypertension    Lymphedema    Vitamin D deficiency    Polyneuropathy    Bipolar 1 disorder (CMS/HCC)    Shortness of breath    Obesity, morbid, BMI 50 or higher (CMS/HCC)  suspected NIGEL/OHS    PLAN:  Wean FiO2 as tolerated.  Now down to 4-6L NC. Titrate for sats above 90%.      He likely has acute on chronic hypercapnic respiratory failure likely from his obesity hypoventilation syndrome.  We will continue with noninvasive ventilation.  BiPAP was ineffective so he was switched to AVAPS settings. Recommend Sleep study as OP vs trilogy. He is encouraged to use AVAPS every night and as needed during during daytime.    His CT scan of the chest is concerning for pulmonary hypertension.    -Will need further work-up for this pulmonary pretension but likely undiagnosed obesity hypoventilation syndrome and obstructive sleep apnea is playing a role.  Is probably an element of group 2 diastolic heart failure.      Diuresis as appropriate  Continue dexamethasone daily for total of 10 days  Not a candidate for Remdesivir due to length of infection  Continue Proventil and Symbicort  He is on Lovenox for DVT prophylaxis.     Will follow. DC planning back to Rehab    THIS DOCUMENT HAS BEEN ELECTRONICALLY SIGNED BY  Lino Forrester MD  11:59 AM

## 2020-08-19 NOTE — PLAN OF CARE
Problem: Patient Care Overview  Goal: Plan of Care Review  Outcome: Ongoing (interventions implemented as appropriate)  Flowsheets  Taken 8/18/2020 1724  Progress: improving  Taken 8/19/2020 1648  Plan of Care Reviewed With: patient  Outcome Summary: pt voiced no concerns this shift; worked with PT/OT, sat up in chair; O2 titrated to 4-6L per n/c, tolerating well; plan is for discharge back to [facility] tomorrow 8/20; will continue to monitor

## 2020-08-19 NOTE — PLAN OF CARE
Patient resting abed, no complaints voiced. He refused to wear the CPAP this shift, stated he didn't like it. He continues to be on 6L high flow. A couple times throughout the night he took his O2 off and he dropped into the 70s. We spoke about normal O2 levels and the need to keep his O2 in place. Will continue to monitor

## 2020-08-19 NOTE — PROGRESS NOTES
Continued Stay Note   [facility]     Patient Name: Fox Ramirez  MRN: [id number]  Today's Date: 8/19/2020    Admit Date: 8/13/2020    Discharge Plan     Row Name 08/19/20 1527       Plan    Plan Comments  Possible DC tomorrow.    Row Name 08/19/20 1524       Plan    Plan  DC Plan: Return to [facility], no PASRR or precert required.     Plan Comments  O2 requirement recently back up to 6L pnc.                   Natali Dia RN

## 2020-08-19 NOTE — PROGRESS NOTES
[facility] Medicine Services Daily Progress Note      Hospitalist Team  LOS 6 days      Patient Care Team:  Steve Valerio MD as PCP - General (Hospitalist)    Patient Location: 210/1      Subjective   Subjective     Chief Complaint / Subjective  Chief Complaint   Patient presents with   • Shortness of Breath         Brief Synopsis of Hospital Course/HPI    Mr. Ramirez is a 65 y.o. morbidly obese male who was sent to the [facility] ED on 08/13/2020 from [facility] complaining of shortness of breath. The patient is from [place] and recently tested positive for COVID-19. He was sent to [facility] as they have a unit dedicated to COVID positive patients. The patient was reportedly hypoxic overnight with O2 sat mid-80s on room air. He was placed on supplemental oxygen with improvement in his saturation. Upon arrival to the ER here the patient's O2 sat was 91% on 4 liters. His ABG revealed pH 7.308, CO2 81.1, pO2 86.8, HCO3 40.6. He was placed on BiPAP.       CT chest was negative for pulmonary embolism. He was also noted have an ascending aortic aneurysm measuring 4.7 cm. His EKG showed sinus rhythm. He was admitted for further evaluation and treatment.      Admitted for acute hypoxic hypercapnic respiratory failure  Sec  To  COVID  -  19 infection.     Date:: 8/19/2020  Patient seen today at bedside, he has no complaints.  No overnight events reported by his nurse.  He denies any chest pains.  He is still requiring high flow oxygen currently down to 4 L we will continue to titrate.        Review of Systems   Constitution: Negative.   HENT: Negative.    Cardiovascular: Negative.    Respiratory: Positive for shortness of breath.    Skin: Negative.    Gastrointestinal: Negative.    Neurological: Negative.          Objective   Objective      Vital Signs  Temp:  [96 °F (35.6 °C)-96.9 °F (36.1 °C)] 96.6 °F (35.9 °C)  Heart Rate:  [58-83] 61  Resp:   "[16-24] 24  BP: (109-139)/(65-85) 120/65  Oxygen Therapy  SpO2: 93 %  Pulse Oximetry Type: Continuous  Device (Oxygen Therapy): nasal cannula  Device (Oxygen Therapy): high-flow nasal cannula  Flow (L/min): 4  Oxygen Concentration (%): 44  Oximetry Probe Site Changed: Yes  Flowsheet Rows      First Filed Value   Admission Height  185.4 cm (73\") Documented at 08/13/2020 0307   Admission Weight  (!) 174 kg (384 lb) [Patient states weight this morning at facilty ] Documented at 08/13/2020 0307        Intake & Output (last 3 days)       08/16 0701 - 08/17 0700 08/17 0701 - 08/18 0700 08/18 0701 - 08/19 0700 08/19 0701 - 08/20 0700    P.O. 600 1200 720     Total Intake(mL/kg) 600 (4.8) 1200 (7) 720 (4.2)     Urine (mL/kg/hr) 900 (0.3) 1500 (0.4) 2100 (0.5) 500 (0.8)    Stool  0 0     Total Output 900 1500 2100 500    Net -300 -300 -1380 -500            Urine Unmeasured Occurrence 1 x 2 x 2 x     Stool Unmeasured Occurrence  1 x          Lines, Drains & Airways    Active LDAs     None                  Physical Exam:    Physical Exam   Constitutional: He is oriented to person, place, and time. He appears well-developed and well-nourished.   HENT:   Head: Normocephalic and atraumatic.   Eyes: Pupils are equal, round, and reactive to light. EOM are normal.   Neck: Normal range of motion. Neck supple.   Cardiovascular: Normal rate, regular rhythm, normal heart sounds and intact distal pulses.   Pulmonary/Chest: Effort normal.   Decreased air entry bilaterally no crepitus no wheezing   Abdominal: Soft. Bowel sounds are normal.   Musculoskeletal: Normal range of motion.   Neurological: He is alert and oriented to person, place, and time.   Skin: Skin is warm and dry. Capillary refill takes less than 2 seconds.   Nursing note and vitals reviewed.           Wounds (last 24 hours)      LDA Wound     Row Name 08/19/20 0758 08/18/20 2047          Wound 08/13/20 0845 coccyx     Wound - Properties Group Date first assessed: 08/13/20 "  -AF Time first assessed: 0845  -AF Location: coccyx  -AF Primary Wound Type: --  -AF, small open spot, unable to take photo, zinc ordered, wocn consulted     Dressing Appearance  open to air  -VS  open to air  -JA     Closure  Open to air  -VS  Open to air  -JA     Base  blanchable;dry;pink  -VS  blanchable;dry;pink  -JA     Periwound  intact;dry;blanchable  -VS  intact;dry;blanchable  -JA     Periwound Temperature  warm  -VS  warm  -JA     Drainage Amount  none  -VS  --     Care, Wound  -- zinc oxide applied  -VS  --     Dressing Care, Wound  -- zinc oxide applied   -VS  --       User Key  (r) = Recorded By, (t) = Taken By, (c) = Cosigned By    Initials Name Provider Type    AF Yovana Awan RN Registered Nurse    VS Sturgeon, Valerie, LPN Licensed Nurse    Marycruz Ugalde LPN Licensed Nurse          Procedures:              Results Review:     I reviewed the patient's new clinical results.      Lab Results (last 24 hours)     Procedure Component Value Units Date/Time    Ferritin [[id number]]  (Normal) Collected:  08/19/20 0738    Specimen:  Blood Updated:  08/19/20 0844     Ferritin 102.10 ng/mL     Narrative:       Results may be falsely decreased if patient taking Biotin.      Basic Metabolic Panel [[id number]]  (Abnormal) Collected:  08/19/20 0738    Specimen:  Blood Updated:  08/19/20 0838     Glucose 118 mg/dL      BUN --     Comment: Testing performed by alternate method        Creatinine 0.69 mg/dL      Sodium 129 mmol/L      Potassium 4.8 mmol/L      Chloride 91 mmol/L      CO2 30.0 mmol/L      Calcium 8.8 mg/dL      eGFR Non African Amer 115 mL/min/1.73      BUN/Creatinine Ratio --     Comment: Testing not performed        Anion Gap 8.0 mmol/L     Narrative:       GFR Normal >60  Chronic Kidney Disease <60  Kidney Failure <15      BUN [[id number]] Collected:  08/19/20 0738    Specimen:  Blood Updated:  08/19/20 0838    D-dimer, Quantitative [[id number]]  (Normal) Collected:  08/19/20 0738     Specimen:  Blood Updated:  08/19/20 0819     D-Dimer, Quantitative <0.19 mg/L (FEU)     Narrative:       Reference Range  --------------------------------------------------------------------     < 0.50   Negative Predictive Value  0.50-0.59   Indeterminate    >= 0.60   Probable VTE             A very low percentage of patients with DVT may yield D-Dimer results   below the cut-off of 0.50 mg/L FEU.  This is known to be more   prevalent in patients with distal DVT.             Results of this test should always be interpreted in conjunction with   the patient's medical history, clinical presentation and other   findings.  Clinical diagnosis should not be based on the result of   INNOVANCE D-Dimer alone.    CBC & Differential [[id number]] Collected:  08/19/20 0738    Specimen:  Blood Updated:  08/19/20 0813    Narrative:       The following orders were created for panel order CBC & Differential.  Procedure                               Abnormality         Status                     ---------                               -----------         ------                     CBC Auto Differential[[id number]]        Abnormal            Final result                 Please view results for these tests on the individual orders.    CBC Auto Differential [[id number]]  (Abnormal) Collected:  08/19/20 0738    Specimen:  Blood Updated:  08/19/20 0813     WBC 8.70 10*3/mm3      RBC 4.97 10*6/mm3      Hemoglobin 15.2 g/dL      Hematocrit 45.6 %      MCV 91.7 fL      MCH 30.6 pg      MCHC 33.4 g/dL      RDW 13.5 %      RDW-SD 42.9 fl      MPV 10.3 fL      Platelets 162 10*3/mm3      Neutrophil % 76.9 %      Lymphocyte % 16.2 %      Monocyte % 5.9 %      Eosinophil % 0.2 %      Basophil % 0.8 %      Neutrophils, Absolute 6.70 10*3/mm3      Lymphocytes, Absolute 1.40 10*3/mm3      Monocytes, Absolute 0.50 10*3/mm3      Eosinophils, Absolute 0.00 10*3/mm3      Basophils, Absolute 0.10 10*3/mm3      nRBC 0.2 /100 WBC     POC Glucose Once  [[id number]]  (Abnormal) Collected:  08/19/20 0741    Specimen:  Blood Updated:  08/19/20 0744     Glucose 122 mg/dL      Comment: Serial Number: [id number]:  [id number]       POC Glucose Once [[id number]]  (Abnormal) Collected:  08/18/20 2042    Specimen:  Blood Updated:  08/18/20 2043     Glucose 180 mg/dL      Comment: Serial Number: [id number]:  [id number]       POC Glucose Once [[id number]]  (Abnormal) Collected:  08/18/20 1716    Specimen:  Blood Updated:  08/18/20 1716     Glucose 133 mg/dL      Comment: Serial Number: [id number]:  [id number]       BUN [[id number]]  (Normal) Collected:  08/18/20 1036    Specimen:  Blood Updated:  08/18/20 1704     BUN 22 mg/dL     POC Glucose Once [[id number]]  (Abnormal) Collected:  08/18/20 1151    Specimen:  Blood Updated:  08/18/20 1202     Glucose 124 mg/dL      Comment: Serial Number: [id number]:  [id number]       Basic Metabolic Panel [[id number]]  (Abnormal) Collected:  08/18/20 1036    Specimen:  Blood Updated:  08/18/20 1124     Glucose 169 mg/dL      BUN --     Comment: Testing performed by alternate method        Creatinine 0.69 mg/dL      Sodium 130 mmol/L      Potassium 4.3 mmol/L      Comment: Specimen hemolyzed.  Results may be affected.        Chloride 92 mmol/L      CO2 28.0 mmol/L      Calcium 8.5 mg/dL      eGFR Non African Amer 115 mL/min/1.73      BUN/Creatinine Ratio --     Comment: Testing not performed        Anion Gap 10.0 mmol/L     Narrative:       GFR Normal >60  Chronic Kidney Disease <60  Kidney Failure <15          No results found for: HGBA1C  Results from last 7 days   Lab Units 08/13/20  0318   INR  1.00       Results from last 7 days   Lab Units 08/15/20  0347   PH, ARTERIAL pH units 7.368   PO2 ART mm Hg 97.2   PCO2, ARTERIAL mm Hg 67.2*   HCO3 ART mmol/L 38.7*     No results found for: LIPASE  No results found for: CHOL, CHLPL, TRIG, HDL, LDL, LDLDIRECT    No results found for: INTRAOP, PREDX, FINALDX,  COMDX    Microbiology Results (last 10 days)     Procedure Component Value - Date/Time    S. Pneumo Ag Urine or CSF - Urine, Urine, Clean Catch [[id number]]  (Normal) Collected:  08/13/20 1810    Lab Status:  Final result Specimen:  Urine, Clean Catch Updated:  08/13/20 1856     Strep Pneumo Ag Negative    Legionella Antigen, Urine - Urine, Urine, Clean Catch [[id number]]  (Normal) Collected:  08/13/20 1810    Lab Status:  Final result Specimen:  Urine, Clean Catch Updated:  08/13/20 1855     LEGIONELLA ANTIGEN, URINE Negative    Respiratory Panel PCR w/COVID-19(SARS-CoV-2) IMMANUEL/JESSICA/JOSEMANUEL/PAD In-House, NP Swab in UTM/VTM, 3-4 HR TAT - Swab, Nasopharynx [[id number]]  (Abnormal) Collected:  08/13/20 1253    Lab Status:  Final result Specimen:  Swab from Nasopharynx Updated:  08/13/20 1416     ADENOVIRUS, PCR Not Detected     Coronavirus 229E Not Detected     Coronavirus HKU1 Not Detected     Coronavirus NL63 Not Detected     Coronavirus OC43 Not Detected     COVID19 Detected     Human Metapneumovirus Not Detected     Human Rhinovirus/Enterovirus Not Detected     Influenza A PCR Not Detected     Influenza A H1 Not Detected     Influenza A H1 2009 PCR Not Detected     Influenza A H3 Not Detected     Influenza B PCR Not Detected     Parainfluenza Virus 1 Not Detected     Parainfluenza Virus 2 Not Detected     Parainfluenza Virus 3 Not Detected     Parainfluenza Virus 4 Not Detected     RSV, PCR Not Detected     Bordetella pertussis pcr Not Detected     Bordetella parapertussis PCR Not Detected     Chlamydophila pneumoniae PCR Not Detected     Mycoplasma pneumo by PCR Not Detected    Narrative:       Fact sheet for providers: https://docs.[employer]/wp-content/uploads/ZDY9562-6467-NY0.1-EUA-Provider-Fact-Sheet-3.pdf    Fact sheet for patients: https://docs.[employer]/wp-content/uploads/AXP4382-5711-BB5.1-EUA-Patient-Fact-Sheet-1.pdf          ECG/EMG Results (most recent)     Procedure Component Value Units Date/Time     ECG 12 Lead [[id number]] Collected:  08/13/20 0302     Updated:  08/14/20 1708    Narrative:       HEART RATE= 79  bpm  RR Interval= 760  ms  MT Interval= 192  ms  P Horizontal Axis= 10  deg  P Front Axis= 42  deg  QRSD Interval= 126  ms  QT Interval= 384  ms  QRS Axis= -55  deg  T Wave Axis= 101  deg  - ABNORMAL ECG -  Sinus rhythm  Nonspecific IVCD with LAD  Inferior infarct, old  Nonspecific T abnormalities, lateral leads  No previous ECG available for comparison  Electronically Signed By: Davi Hinojosa (Babatunde) 14-Aug-2020 16:57:21  Date and Time of Study: 2020-08-13 03:02:12                    Ct Chest Pulmonary Embolism    Result Date: 8/13/2020  1. No evidence of pulmonary embolism. No evidence of an acute intrathoracic process. 2. Borderline enlarged right hilar lymph nodes, nonspecific. 3. Fusiform ascending aortic aneurysm measuring 4.7 cm in diameter. 4. Enlarged pulmonary trunk. This can be seen in the setting of pulmonary arterial hypertension. 5. Coronary artery atherosclerosis. 6. Nonobstructing stones in each kidney. Electronically signed by:  Davey Boo M.D.  8/13/2020 2:51 AM          Xrays, labs reviewed personally by physician.    Medication Review:   I have reviewed the patient's current medication list      Scheduled Meds    albuterol sulfate HFA 2 puff Inhalation 4x Daily - RT   amLODIPine 10 mg Oral Daily   budesonide-formoterol 2 puff Inhalation BID - RT   celecoxib 100 mg Oral BID   dexamethasone 6 mg Oral Daily With Breakfast   divalproex 750 mg Oral TID   enoxaparin 40 mg Subcutaneous Q12H   furosemide 20 mg Oral Daily   gabapentin 600 mg Oral Q8H   insulin lispro 0-9 Units Subcutaneous 4x Daily With Meals & Nightly   lisinopril 40 mg Oral Q24H   QUEtiapine 200 mg Oral BID   QUEtiapine  mg Oral Nightly   sodium chloride 10 mL Intravenous Q12H   vitamin D3 5,000 Units Oral Daily   zinc oxide  Topical Q12H       Meds Infusions       Meds PRN  •  acetaminophen **OR** acetaminophen  **OR** acetaminophen  •  albuterol sulfate HFA  •  aluminum-magnesium hydroxide-simethicone  •  bisacodyl  •  cyclobenzaprine  •  dextrose  •  dextrose  •  glucagon (human recombinant)  •  hydrALAZINE  •  insulin lispro **AND** insulin lispro  •  magnesium hydroxide  •  magnesium sulfate **OR** magnesium sulfate in D5W 1g/100mL (PREMIX)  •  melatonin  •  nitroglycerin  •  ondansetron **OR** ondansetron  •  potassium chloride **OR** potassium chloride **OR** potassium chloride  •  sodium chloride        Assessment/Plan   Assessment/Plan     Active Hospital Problems    Diagnosis  POA   • **Acute respiratory failure with hypoxia and hypercapnia (CMS/HCC) [J96.01, J96.02]  Yes   • COVID-19 [U07.1]  Yes   • Ascending aortic aneurysm (CMS/HCC) [I71.2]  Yes   • Essential hypertension [I10]  Yes   • Lymphedema [I89.0]  Yes   • Vitamin D deficiency [E55.9]  Yes   • Polyneuropathy [G62.9]  Yes   • Bipolar 1 disorder (CMS/[facility]) [F31.9]  Yes   • Shortness of breath [R06.02]  Yes   • Obesity, morbid, BMI 50 or higher (CMS/HCC) [E66.01]  Yes      Resolved Hospital Problems   No resolved problems to display.       MEDICAL DECISION MAKING COMPLEXITY BY PROBLEM:     Mr. Ramirez is a 65 y.o. morbidly obese male who was sent to the [facility] ED on 08/13/2020 from [facility] complaining of shortness of breath. The patient is from [place] and recently tested positive for COVID-19. He was sent to [facility] as they have a unit dedicated to COVID positive patients. The patient was reportedly hypoxic overnight with O2 sat mid-80s on room air. He was placed on supplemental oxygen with improvement in his saturation. Upon arrival to the ER here the patient's O2 sat was 91% on 4 liters. His ABG revealed pH 7.308, CO2 81.1, pO2 86.8, HCO3 40.6. He was placed on BiPAP.       CT chest was negative for pulmonary embolism. He was also noted have an ascending aortic aneurysm measuring 4.7 cm. His EKG  showed sinus rhythm. He was admitted for further evaluation and treatment.      Admitted for acute hypoxic hypercapnic respiratory failure secondary to COVID-19    Assessment  Acute hypoxic hypercapnic respiratory failure secondary to COVID-19 infection  PE has been ruled out by CTA  CT concerning for pulmonary hypertension, will defer management to pulmonologist  Significant consideration for obesity hypoventilation syndrome and obstructive sleep apnea, defer management to pulmonologist  Pulmonary services appreciated  Currently on high flow oxygen, he is down to 4 L we will continue to wean him off  Continue dexamethasone  Continue albuterol and Symbicort  So far strep and Legionella is unremarkable  Maintain isolation  Continue Lasix 20 mg daily and monitor renal function very closely  Not a candidate for Remdesir  due to length of infection   Sleep study as OP vs trilogy.  AVAPS every night and as needed during during daytime.        COVID 19- Infection   previously diagnosed   continue supplemental oxygen and steroids  not a candidate for Remdesivir  Pulmonary services appreciated     Ascending aortic aneurysm  incidental finding on CT scan, measures 4.7 cm  referral to vascular surgery once COVID negative     Essential hypertension, chronic, controlled  amlodipine, lisinopril and lasix    monitor with routine VS and make adjustments as needed     Hyponatremia  Suspect secondary to diuretics and possible overload  We will continue to monitor daily     Lymphedema  Cont po lasix      Vitamin D deficiency  Continue supplement      Polyneuropathy  continue Celebrex, Flexeril and gabapentin     Bipolar 1 disorder   Depakote and Seroquel        Morbid obesity    on dietary changes      DVT Prophylaxis  enoxaparin       VTE Prophylaxis -   Mechanical Order History:     None      Pharmalogical Order History:     Ordered     Dose Route Frequency Stop    08/14/20 1420  enoxaparin (LOVENOX) syringe 40 mg      40 mg  SC Every 12 Hours --    08/13/20 0734  enoxaparin (LOVENOX) syringe 90 mg  Status:  Discontinued      0.5 mg/kg SC Every 12 Hours 08/14/20 1420            Code Status -   Code Status and Medical Interventions:   Ordered at: 08/13/20 0734     Code Status:    CPR     Medical Interventions (Level of Support Prior to Arrest):    Full       This patient has been examined wearing appropriate Personal Protective Equipment     Discharge Planning  Pending clinical improvement          Electronically signed by Padmaja Schilling MD, 08/19/20, 10:34.  [facility] Hospitalist Team
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
## 2020-08-20 VITALS
OXYGEN SATURATION: 90 % | RESPIRATION RATE: 18 BRPM | SYSTOLIC BLOOD PRESSURE: 140 MMHG | TEMPERATURE: 97 F | BODY MASS INDEX: 41.75 KG/M2 | DIASTOLIC BLOOD PRESSURE: 77 MMHG | WEIGHT: 315 LBS | HEIGHT: 73 IN | HEART RATE: 63 BPM

## 2020-08-20 LAB
ANION GAP SERPL CALCULATED.3IONS-SCNC: 8 MMOL/L (ref 5–15)
BASOPHILS # BLD AUTO: 0.1 10*3/MM3 (ref 0–0.2)
BASOPHILS NFR BLD AUTO: 0.8 % (ref 0–1.5)
BUN SERPL-MCNC: 20 MG/DL (ref 8–23)
BUN SERPL-MCNC: ABNORMAL MG/DL
BUN/CREAT SERPL: ABNORMAL
CALCIUM SPEC-SCNC: 8.5 MG/DL (ref 8.6–10.5)
CHLORIDE SERPL-SCNC: 92 MMOL/L (ref 98–107)
CO2 SERPL-SCNC: 27 MMOL/L (ref 22–29)
CREAT SERPL-MCNC: 0.69 MG/DL (ref 0.76–1.27)
DEPRECATED RDW RBC AUTO: 42.4 FL (ref 37–54)
EOSINOPHIL # BLD AUTO: 0 10*3/MM3 (ref 0–0.4)
EOSINOPHIL NFR BLD AUTO: 0.3 % (ref 0.3–6.2)
ERYTHROCYTE [DISTWIDTH] IN BLOOD BY AUTOMATED COUNT: 13.5 % (ref 12.3–15.4)
GFR SERPL CREATININE-BSD FRML MDRD: 115 ML/MIN/1.73
GLUCOSE BLDC GLUCOMTR-MCNC: 132 MG/DL (ref 70–105)
GLUCOSE BLDC GLUCOMTR-MCNC: 156 MG/DL (ref 70–105)
GLUCOSE SERPL-MCNC: 128 MG/DL (ref 65–99)
HCT VFR BLD AUTO: 46.1 % (ref 37.5–51)
HGB BLD-MCNC: 15.3 G/DL (ref 13–17.7)
LYMPHOCYTES # BLD AUTO: 1.6 10*3/MM3 (ref 0.7–3.1)
LYMPHOCYTES NFR BLD AUTO: 19.4 % (ref 19.6–45.3)
MCH RBC QN AUTO: 30.3 PG (ref 26.6–33)
MCHC RBC AUTO-ENTMCNC: 33.2 G/DL (ref 31.5–35.7)
MCV RBC AUTO: 91.3 FL (ref 79–97)
MONOCYTES # BLD AUTO: 0.6 10*3/MM3 (ref 0.1–0.9)
MONOCYTES NFR BLD AUTO: 7.3 % (ref 5–12)
NEUTROPHILS NFR BLD AUTO: 6.1 10*3/MM3 (ref 1.7–7)
NEUTROPHILS NFR BLD AUTO: 72.2 % (ref 42.7–76)
NRBC BLD AUTO-RTO: 0.1 /100 WBC (ref 0–0.2)
PLATELET # BLD AUTO: 148 10*3/MM3 (ref 140–450)
PMV BLD AUTO: 10.8 FL (ref 6–12)
POTASSIUM SERPL-SCNC: 4.8 MMOL/L (ref 3.5–5.2)
RBC # BLD AUTO: 5.05 10*6/MM3 (ref 4.14–5.8)
SODIUM SERPL-SCNC: 127 MMOL/L (ref 136–145)
WBC # BLD AUTO: 8.4 10*3/MM3 (ref 3.4–10.8)

## 2020-08-20 PROCEDURE — 25010000002 ENOXAPARIN PER 10 MG: Performed by: INTERNAL MEDICINE

## 2020-08-20 PROCEDURE — 63710000001 INSULIN LISPRO (HUMAN) PER 5 UNITS: Performed by: INTERNAL MEDICINE

## 2020-08-20 PROCEDURE — 82962 GLUCOSE BLOOD TEST: CPT

## 2020-08-20 PROCEDURE — 80048 BASIC METABOLIC PNL TOTAL CA: CPT | Performed by: INTERNAL MEDICINE

## 2020-08-20 PROCEDURE — 94799 UNLISTED PULMONARY SVC/PX: CPT

## 2020-08-20 PROCEDURE — 85025 COMPLETE CBC W/AUTO DIFF WBC: CPT | Performed by: INTERNAL MEDICINE

## 2020-08-20 PROCEDURE — 99239 HOSP IP/OBS DSCHRG MGMT >30: CPT | Performed by: INTERNAL MEDICINE

## 2020-08-20 RX ORDER — BUDESONIDE AND FORMOTEROL FUMARATE DIHYDRATE 160; 4.5 UG/1; UG/1
2 AEROSOL RESPIRATORY (INHALATION)
Qty: 30 INHALER | Refills: 0 | Status: SHIPPED | OUTPATIENT
Start: 2020-08-20

## 2020-08-20 RX ORDER — DEXAMETHASONE 6 MG/1
6 TABLET ORAL
Qty: 3 TABLET | Refills: 0 | Status: SHIPPED | OUTPATIENT
Start: 2020-08-21 | End: 2020-08-24

## 2020-08-20 RX ORDER — ALBUTEROL SULFATE 90 UG/1
2 AEROSOL, METERED RESPIRATORY (INHALATION) EVERY 4 HOURS PRN
Qty: 1 G | Refills: 0 | Status: SHIPPED | OUTPATIENT
Start: 2020-08-20

## 2020-08-20 RX ADMIN — QUETIAPINE 200 MG: 100 TABLET, FILM COATED ORAL at 08:45

## 2020-08-20 RX ADMIN — AMLODIPINE BESYLATE 10 MG: 5 TABLET ORAL at 08:46

## 2020-08-20 RX ADMIN — DIVALPROEX SODIUM 750 MG: 250 TABLET, DELAYED RELEASE ORAL at 15:30

## 2020-08-20 RX ADMIN — ALBUTEROL SULFATE 2 PUFF: 90 AEROSOL, METERED RESPIRATORY (INHALATION) at 14:55

## 2020-08-20 RX ADMIN — DIVALPROEX SODIUM 750 MG: 250 TABLET, DELAYED RELEASE ORAL at 08:46

## 2020-08-20 RX ADMIN — CELECOXIB 100 MG: 100 CAPSULE ORAL at 08:46

## 2020-08-20 RX ADMIN — Medication 5000 UNITS: at 08:46

## 2020-08-20 RX ADMIN — ALBUTEROL SULFATE 2 PUFF: 90 AEROSOL, METERED RESPIRATORY (INHALATION) at 11:12

## 2020-08-20 RX ADMIN — GABAPENTIN 600 MG: 300 CAPSULE ORAL at 15:30

## 2020-08-20 RX ADMIN — GABAPENTIN 600 MG: 300 CAPSULE ORAL at 05:20

## 2020-08-20 RX ADMIN — BUDESONIDE AND FORMOTEROL FUMARATE DIHYDRATE 2 PUFF: 160; 4.5 AEROSOL RESPIRATORY (INHALATION) at 07:15

## 2020-08-20 RX ADMIN — INSULIN LISPRO 2 UNITS: 100 INJECTION, SOLUTION INTRAVENOUS; SUBCUTANEOUS at 11:58

## 2020-08-20 RX ADMIN — ZINC OXIDE: 200 OINTMENT TOPICAL at 09:25

## 2020-08-20 RX ADMIN — FUROSEMIDE 20 MG: 20 TABLET ORAL at 08:46

## 2020-08-20 RX ADMIN — ALBUTEROL SULFATE 2 PUFF: 90 AEROSOL, METERED RESPIRATORY (INHALATION) at 07:15

## 2020-08-20 RX ADMIN — ENOXAPARIN SODIUM 40 MG: 40 INJECTION SUBCUTANEOUS at 08:45

## 2020-08-20 RX ADMIN — DEXAMETHASONE 6 MG: 6 TABLET ORAL at 08:46

## 2020-08-20 NOTE — DISCHARGE SUMMARY
[facility] Medicine Services  DISCHARGE SUMMARY        Prepared For PCP:  Steve Valerio MD    Patient Name: Fox Ramirez  : 1955  MRN: [id number]      Date of Admission:   2020    Date of Discharge:  2020    Length of stay:  LOS: 7 days     Hospital Course     Presenting Problem:   Ascending aortic aneurysm (CMS/HCC) [I71.2]  Acute respiratory failure with hypoxia and hypercapnia (CMS/HCC) [J96.01, J96.02]  COVID-19 [U07.1]  Acute respiratory failure with hypoxia and hypercapnia (CMS/HCC) [J96.01, J96.02]      Active Hospital Problems    Diagnosis  POA   • **Acute respiratory failure with hypoxia and hypercapnia (CMS/HCC) [J96.01, J96.02]  Yes   • COVID-19 [U07.1]  Yes   • Ascending aortic aneurysm (CMS/HCC) [I71.2]  Yes   • Essential hypertension [I10]  Yes   • Lymphedema [I89.0]  Yes   • Vitamin D deficiency [E55.9]  Yes   • Polyneuropathy [G62.9]  Yes   • Bipolar 1 disorder (CMS/HCC) [F31.9]  Yes   • Shortness of breath [R06.02]  Yes   • Obesity, morbid, BMI 50 or higher (CMS/HCC) [E66.01]  Yes      Resolved Hospital Problems   No resolved problems to display.           Hospital Course:  Fox Ramirez is a 65 y.o. male  morbidly obese male who was sent to the [facility] ED on 2020 from [facility] complaining of shortness of breath. The patient is from [place] and recently tested positive for COVID-19. He was sent to [facility] as they have a unit dedicated to COVID positive patients. The patient was reportedly hypoxic overnight with O2 sat mid-80s on room air. He was placed on supplemental oxygen with improvement in his saturation. Upon arrival to the ER here the patient's O2 sat was 91% on 4 liters. His ABG revealed pH 7.308, CO2 81.1, pO2 86.8, HCO3 40.6. He was placed on BiPAP.       CT chest was negative for pulmonary embolism. He was also noted have an ascending aortic aneurysm measuring 4.7 cm. His EKG  showed sinus rhythm. He was admitted for further evaluation and treatment.      Admitted for acute hypoxic hypercapnic respiratory failure secondary to COVID-19  PE has been ruled out by CTA  CT concerning for pulmonary hypertension, will defer management to pulmonologist  Significant consideration for obesity hypoventilation syndrome and obstructive sleep apnea, defer management to pulmonologist  Continue albuterol and Symbicort  So far strep and Legionella is unremarkable  Continue Lasix 20 mg daily and monitor renal function very closely  Not a candidate for Remdesir  due to length of infection   Sleep study as OP vs trilogy.  AVAPS every night and as needed during during daytime.        COVID 19- Infection   previously diagnosed   continue supplemental oxygen and steroids  not a candidate for Remdesivir       He Is now tolerating O2 at 3 L via nasal cannula and saturating above 90  Medically  stable to be discharged to rehab    This is a brief discharge summary please refer to daily progress notes for more detailed hospital course    Reasons For Change In Medications and Indications for New Medications:        Day of Discharge     HPI:   Mr. Ramirez is a 65 y.o. morbidly obese male who was sent to the [facility] ED on 08/13/2020 from [facility] complaining of shortness of breath. The patient is from [place] and recently tested positive for COVID-19. He was sent to [facility] as they have a unit dedicated to COVID positive patients. The patient was reportedly hypoxic overnight with O2 sat mid-80s on room air. He was placed on supplemental oxygen with improvement in his saturation. Upon arrival to the ER here the patient's O2 sat was 91% on 4 liters. His ABG revealed pH 7.308, CO2 81.1, pO2 86.8, HCO3 40.6. He was placed on BiPAP.       CT chest was negative for pulmonary embolism. He was also noted have an ascending aortic aneurysm measuring 4.7 cm. His EKG showed  sinus rhythm. He was admitted for further evaluation and treatment.      Admitted for acute hypoxic hypercapnic respiratory failure secondary to COVID-19    Vital Signs:   Temp:  [96.1 °F (35.6 °C)-98 °F (36.7 °C)] 97 °F (36.1 °C)  Heart Rate:  [63-86] 69  Resp:  [18-21] 18  BP: (122-140)/(60-84) 140/77     Physical Exam:  Physical Exam   Constitutional: He is oriented to person, place, and time. He appears well-developed and well-nourished.   HENT:   Head: Normocephalic and atraumatic.   Eyes: Pupils are equal, round, and reactive to light. EOM are normal.   Neck: Normal range of motion. Neck supple.   Cardiovascular: Normal rate, regular rhythm, normal heart sounds and intact distal pulses.   Pulmonary/Chest: Effort normal and breath sounds normal.   Abdominal: Soft. Bowel sounds are normal.   Musculoskeletal: Normal range of motion.   Neurological: He is alert and oriented to person, place, and time.   Skin: Skin is warm and dry. Capillary refill takes less than 2 seconds.   Nursing note and vitals reviewed.      Pertinent  and/or Most Recent Results     Results from last 7 days   Lab Units 08/20/20  0730 08/19/20  0738 08/18/20  1036 08/18/20  0719 08/17/20  1318 08/16/20  0524 08/15/20  0315 08/14/20  0716   WBC 10*3/mm3 8.40 8.70  --  9.20 7.40  --  7.70 8.30   HEMOGLOBIN g/dL 15.3 15.2  --  15.6 14.9  --  14.5 15.4   HEMATOCRIT % 46.1 45.6  --  47.8 45.6  --  44.3 47.3   PLATELETS 10*3/mm3 148 162  --  127* 167  --  207 115*   SODIUM mmol/L 127* 129* 130*  --  134*  --  138 138   POTASSIUM mmol/L 4.8 4.8 4.3  --  4.8 4.6 4.4 4.4   CHLORIDE mmol/L 92* 91* 92*  --  95*  --  96* 97*   CO2 mmol/L 27.0 30.0* 28.0  --  32.0*  --  33.0* 34.0*   BUN   --  23 22  --  22  --  23 22   CREATININE mg/dL 0.69* 0.69* 0.69*  --  0.72*  --  0.71* 0.71*   GLUCOSE mg/dL 128* 118* 169*  --  174*  --  119* 106*   CALCIUM mg/dL 8.5* 8.8 8.5*  --  8.8  --  8.7 8.8     Results from last 7 days   Lab Units 08/15/20  2734  08/14/20  0716   BILIRUBIN mg/dL 0.4 0.5   ALK PHOS U/L 37* 42   ALT (SGPT) U/L 30 26   AST (SGOT) U/L 30 26           Invalid input(s): TG, LDLCALC, LDLREALC  Results from last 7 days   Lab Units 08/13/20  1433   PROBNP pg/mL 37.2       Brief Urine Lab Results     None          Microbiology Results Abnormal     Procedure Component Value - Date/Time    S. Pneumo Ag Urine or CSF - Urine, Urine, Clean Catch [[id number]]  (Normal) Collected:  08/13/20 1810    Lab Status:  Final result Specimen:  Urine, Clean Catch Updated:  08/13/20 1856     Strep Pneumo Ag Negative    Legionella Antigen, Urine - Urine, Urine, Clean Catch [[id number]]  (Normal) Collected:  08/13/20 1810    Lab Status:  Final result Specimen:  Urine, Clean Catch Updated:  08/13/20 1855     LEGIONELLA ANTIGEN, URINE Negative    Respiratory Panel PCR w/COVID-19(SARS-CoV-2) [facility]ESS[facility] In-House, NP Swab in UTM/VTM, 3-4 HR TAT - Swab, Nasopharynx [[id number]]  (Abnormal) Collected:  08/13/20 1253    Lab Status:  Final result Specimen:  Swab from Nasopharynx Updated:  08/13/20 1416     ADENOVIRUS, PCR Not Detected     Coronavirus 229E Not Detected     Coronavirus HKU1 Not Detected     Coronavirus NL63 Not Detected     Coronavirus OC43 Not Detected     COVID19 Detected     Human Metapneumovirus Not Detected     Human Rhinovirus/Enterovirus Not Detected     Influenza A PCR Not Detected     Influenza A H1 Not Detected     Influenza A H1 2009 PCR Not Detected     Influenza A H3 Not Detected     Influenza B PCR Not Detected     Parainfluenza Virus 1 Not Detected     Parainfluenza Virus 2 Not Detected     Parainfluenza Virus 3 Not Detected     Parainfluenza Virus 4 Not Detected     RSV, PCR Not Detected     Bordetella pertussis pcr Not Detected     Bordetella parapertussis PCR Not Detected     Chlamydophila pneumoniae PCR Not Detected     Mycoplasma pneumo by PCR Not Detected    Narrative:       Fact sheet for providers:  https://docs.[employer]/wp-content/uploads/ZMY9394-0995-IX2.1-EUA-Provider-Fact-Sheet-3.pdf    Fact sheet for patients: https://docs.[employer]/wp-content/uploads/VTD0870-7194-BN7.1-EUA-Patient-Fact-Sheet-1.pdf          Ct Chest Pulmonary Embolism    Result Date: 8/13/2020  Impression: 1. No evidence of pulmonary embolism. No evidence of an acute intrathoracic process. 2. Borderline enlarged right hilar lymph nodes, nonspecific. 3. Fusiform ascending aortic aneurysm measuring 4.7 cm in diameter. 4. Enlarged pulmonary trunk. This can be seen in the setting of pulmonary arterial hypertension. 5. Coronary artery atherosclerosis. 6. Nonobstructing stones in each kidney. Electronically signed by:  Davey Boo M.D.  8/13/2020 2:51 AM                             Test Results Pending at Discharge   Order Current Status    BUN In process            Procedures Performed           Consults:   Consults     Date and Time Order Name Status Description    8/13/2020 0822 Inpatient Pulmonology Consult Completed     8/13/2020 0513 Inpatient Hospitalist Consult Completed             Discharge Details        Discharge Medications      New Medications      Instructions Start Date   albuterol sulfate  (90 Base) MCG/ACT inhaler  Commonly known as:  PROVENTIL HFA;VENTOLIN HFA;PROAIR HFA   2 puffs, Inhalation, Every 4 Hours PRN      budesonide-formoterol 160-4.5 MCG/ACT inhaler  Commonly known as:  SYMBICORT   2 puffs, Inhalation, 2 Times Daily - RT      dexamethasone 6 MG tablet  Commonly known as:  DECADRON   6 mg, Oral, Daily With Breakfast   Start Date:  August 21, 2020        Changes to Medications      Instructions Start Date   QUEtiapine 100 MG tablet  Commonly known as:  SEROquel  What changed:  Another medication with the same name was removed. Continue taking this medication, and follow the directions you see here.   200 mg, Oral, 2 Times Daily         Continue These Medications      Instructions Start Date      amLODIPine 10 MG tablet  Commonly known as:  NORVASC   10 mg, Oral, Daily      celecoxib 100 MG capsule  Commonly known as:  CeleBREX   50 mg, Oral, 2 Times Daily      cyclobenzaprine 5 MG tablet  Commonly known as:  FLEXERIL   5 mg, Oral, 2 Times Daily PRN      divalproex 125 MG DR tablet  Commonly known as:  DEPAKOTE   750 mg, Oral, 3 Times Daily      furosemide 20 MG tablet  Commonly known as:  LASIX   20 mg, Oral, Daily      gabapentin 600 MG tablet  Commonly known as:  NEURONTIN   600 mg, Oral, 3 Times Daily      lisinopril 40 MG tablet  Commonly known as:  PRINIVIL,ZESTRIL   40 mg, Oral, Daily      metFORMIN  MG 24 hr tablet  Commonly known as:  GLUCOPHAGE-XR   500 mg, Oral, Daily With Breakfast      vitamin D3 125 MCG (5000 UT) capsule capsule   1,000 Units, Oral, Daily      zinc oxide 20 % ointment   Topical, Daily, Apply to buttocks topically daily and prn              Allergies   Allergen Reactions   • Morphine Nausea And Vomiting         Discharge Disposition:  Rehab Facility or Unit (DC - External)    Diet:  Hospital:  Diet Order   Procedures   • Diet Cardiac, Diabetic/Consistent Carbs; Healthy Heart; Diabetic - Consistent Carb         Discharge Activity:   Activity Instructions     Activity as Tolerated              CODE STATUS:    Code Status and Medical Interventions:   Ordered at: 08/13/20 0734     Code Status:    CPR     Medical Interventions (Level of Support Prior to Arrest):    Full         Follow-up Appointments  No future appointments.    Additional Instructions for the Follow-ups that You Need to Schedule     Discharge Follow-up with PCP   As directed       Currently Documented PCP:    Steve Valerio MD    PCP Phone Number:    [phone]     Follow Up Details:  routine         Discharge Follow-up with Specialty: pulmonary; 2 Weeks   As directed      Specialty:  pulmonary    Follow Up:  2 Weeks                 Condition on Discharge:      Stable      This patient has been examined  wearing appropriate Personal Protective Equipment     Electronically signed by Padmaja Schilling MD, 08/20/20, 12:49 PM.      Time: I spent  35  minutes on this discharge activity which included face-to-face encounter with the patient/reviewing the data in the system/coordination of the care with the nursing staff as well as consultants/documentation/entering orders.

## 2020-08-20 NOTE — PLAN OF CARE
Patient has been doing well with 3L NC. No c/o pain or discomfort. Will discharge back to [facility] this afternoon. Gave report to Nataliia VAZQUEZ.

## 2020-08-20 NOTE — PLAN OF CARE
Problem: Patient Care Overview  Goal: Plan of Care Review  Outcome: Ongoing (interventions implemented as appropriate)  Flowsheets (Taken 8/20/2020 0313)  Progress: improving  Plan of Care Reviewed With: patient  Outcome Summary: patient has been resting through out the shift. patient reported SOB on exertion, on 4L nasal cannula through out the night with oxygen saturation above 93%. possible discharge back to [facility] today.  Goal: Individualization and Mutuality  Outcome: Ongoing (interventions implemented as appropriate)  Goal: Discharge Needs Assessment  Outcome: Ongoing (interventions implemented as appropriate)  Goal: Interprofessional Rounds/Family Conf  Outcome: Ongoing (interventions implemented as appropriate)     Problem: Fall Risk (Adult)  Goal: Identify Related Risk Factors and Signs and Symptoms  Outcome: Ongoing (interventions implemented as appropriate)  Goal: Identify Related Risk Factors and Signs and Symptoms  Outcome: Ongoing (interventions implemented as appropriate)     Problem: Skin Injury Risk (Adult)  Goal: Identify Related Risk Factors and Signs and Symptoms  Outcome: Ongoing (interventions implemented as appropriate)  Goal: Skin Health and Integrity  Outcome: Ongoing (interventions implemented as appropriate)

## 2020-08-20 NOTE — PROGRESS NOTES
KPA/PULM/CC PROGRESS NOTE    65 y.o. male who was referred for consultation for shortness of breath and respiratory failure.  Patient is a morbidly obese male with remote history of tobacco use but he denies any previous history of COPD or emphysema.  Patient was recently diagnosed with COVID-19 infection and in [place] a few weeks ago.  He subsequently was transferred to a local nursing home.  Patient was noted to have some low O2 sats yesterday.  He was placed on 3 L nasal cannula and was subsequently transferred to the ER for further evaluation.  Patient was evaluated in the ER.  He required BiPAP therapy and was admitted to the hospital.  I have been asked to see him for further evaluation.  At time of my evaluation patient awakens easily.  He does complain of some shortness of breath.  He does complain of some cough.  He does complain of some nausea and diarrhea.  He denies any chest pain or palpitations.  He denies any vomiting.  His shortness of breath gets worse with any activity.  It gets better with resting and use of supplemental oxygen   SUBJECTIVE    8/14: Awake.  Remains on noninvasive ventilation.  More awake and appropriate today.  Breathing some better.  No complaints of chest pain.  No nausea or vomiting  8/15: Currently tolerating 15 L high flow.  Afebrile overnight.  Blood pressure stable  8/16: No acute events overnight.  Currently on 8 L high flow cannula.  No nausea vomiting.  Afebrile.  8/17: Remains Afebrile. O2 requirement stable at 8L. Did not use the CPAP last night  8/18: Shortness of breath is stable.  Oxygen requirement down to 6 L.  Did use CPAP last night some however did not like it    8/19: Feels better. O2 better  8/20: Much improved.  Shortness of breath significantly improved and now down to 2 L via nasal cannula    OBJECTIVE    Vitals:    08/20/20 0600 08/20/20 0715 08/20/20 0833 08/20/20 1112   BP:   140/77    BP Location:       Patient Position:       Pulse:  63 67 69      Resp:  21 18 18   Temp:   97 °F (36.1 °C)    TempSrc:   Oral    SpO2:  91% 93% 92%   Weight: (!) 172 kg (379 lb 3.1 oz)      Height:          Intake/Output last 3 shifts:  I/O last 3 completed shifts:  In: 720 [P.O.:720]  Out: 3300 [Urine:3300]  Intake/Output this shift:  I/O this shift:  In: -   Out: 300 [Urine:300]    Constitutional: Morbidly obese, chronically ill-appearing, no acute distress, non-toxic appearance   Eyes:   conjunctiva normal   HENT:  Atraumatic, external ears normal, nose normal  Neck- normal range of motion, no tenderness, supple   Respiratory:  No respiratory distress, normal breath sounds, no rales, no wheezing   Cardiovascular:  Normal rate, normal rhythm, no murmurs, no gallops, no rubs   GI:  Soft, nondistended, normal bowel sounds, nontender, no rebound, no guarding   Musculoskeletal:  + edema, no tenderness, no deformities.  Integument:  Well hydrated, no rash   Neurologic: Awake, CN 2-12 normal, normal motor function, normal sensory function, no focal deficits noted   Psychiatric:  Speech and behavior appropriate     Scheduled Meds:    albuterol sulfate HFA 2 puff Inhalation 4x Daily - RT   amLODIPine 10 mg Oral Daily   budesonide-formoterol 2 puff Inhalation BID - RT   celecoxib 100 mg Oral BID   dexamethasone 6 mg Oral Daily With Breakfast   divalproex 750 mg Oral TID   enoxaparin 40 mg Subcutaneous Q12H   furosemide 20 mg Oral Daily   gabapentin 600 mg Oral Q8H   insulin lispro 0-9 Units Subcutaneous 4x Daily With Meals & Nightly   lisinopril 40 mg Oral Q24H   QUEtiapine 200 mg Oral BID   QUEtiapine  mg Oral Nightly   sodium chloride 10 mL Intravenous Q12H   vitamin D3 5,000 Units Oral Daily   zinc oxide  Topical Q12H       Continuous Infusions:       PRN Meds:•  acetaminophen **OR** acetaminophen **OR** acetaminophen  •  albuterol sulfate HFA  •  aluminum-magnesium hydroxide-simethicone  •  bisacodyl  •  cyclobenzaprine  •  dextrose  •  dextrose  •  glucagon (human  recombinant)  •  hydrALAZINE  •  insulin lispro **AND** insulin lispro  •  magnesium hydroxide  •  magnesium sulfate **OR** magnesium sulfate in D5W 1g/100mL (PREMIX)  •  melatonin  •  nitroglycerin  •  ondansetron **OR** ondansetron  •  potassium chloride **OR** potassium chloride **OR** potassium chloride  •  sodium chloride     LABS    CBC  Results from last 7 days   Lab Units 08/20/20  0730 08/19/20  0738 08/18/20  0719 08/17/20  1318 08/15/20  0315 08/14/20  0716   WBC 10*3/mm3 8.40 8.70 9.20 7.40 7.70 8.30   RBC 10*6/mm3 5.05 4.97 5.24 4.95 4.74 5.15   HEMOGLOBIN g/dL 15.3 15.2 15.6 14.9 14.5 15.4   HEMATOCRIT % 46.1 45.6 47.8 45.6 44.3 47.3   MCV fL 91.3 91.7 91.3 92.1 93.4 91.7   PLATELETS 10*3/mm3 148 162 127* 167 207 115*       CMP   Results from last 7 days   Lab Units 08/20/20  0730 08/19/20  0738 08/18/20  1036 08/17/20  1318 08/16/20  0524 08/15/20  0315 08/14/20  0716   SODIUM mmol/L 127* 129* 130* 134*  --  138 138   POTASSIUM mmol/L 4.8 4.8 4.3 4.8 4.6 4.4 4.4   CHLORIDE mmol/L 92* 91* 92* 95*  --  96* 97*   CO2 mmol/L 27.0 30.0* 28.0 32.0*  --  33.0* 34.0*   BUN   --  23 22 22  --  23 22   CREATININE mg/dL 0.69* 0.69* 0.69* 0.72*  --  0.71* 0.71*   GLUCOSE mg/dL 128* 118* 169* 174*  --  119* 106*   ALBUMIN g/dL  --   --   --   --   --  3.50 3.50   BILIRUBIN mg/dL  --   --   --   --   --  0.4 0.5   ALK PHOS U/L  --   --   --   --   --  37* 42   AST (SGOT) U/L  --   --   --   --   --  30 26   ALT (SGPT) U/L  --   --   --   --   --  30 26       TROPONIN  Results from last 7 days   Lab Units 08/16/20  0524   CK TOTAL U/L 29       CoAg        ABG  Results from last 7 days   Lab Units 08/15/20  0347 08/14/20  1140   PH, ARTERIAL pH units 7.368 7.369   PCO2, ARTERIAL mm Hg 67.2* 70.5*   PO2 ART mm Hg 97.2 87.2   O2 SATURATION ART % 97.0 95.8   BASE EXCESS ART mmol/L 9.8* 10.8*       Microbiology  Microbiology Results (last 10 days)     Procedure Component Value - Date/Time    S. Pneumo Ag Urine or CSF -  Urine, Urine, Clean Catch [[id number]]  (Normal) Collected:  08/13/20 1810    Lab Status:  Final result Specimen:  Urine, Clean Catch Updated:  08/13/20 1856     Strep Pneumo Ag Negative    Legionella Antigen, Urine - Urine, Urine, Clean Catch [[id number]]  (Normal) Collected:  08/13/20 1810    Lab Status:  Final result Specimen:  Urine, Clean Catch Updated:  08/13/20 1855     LEGIONELLA ANTIGEN, URINE Negative    Respiratory Panel PCR w/COVID-19(SARS-CoV-2) I[facility]/JESSICA/JOSEMANU[facility] In-House, NP Swab in UTM/VTM, 3-4 HR TAT - Swab, Nasopharynx [[id number]]  (Abnormal) Collected:  08/13/20 1253    Lab Status:  Final result Specimen:  Swab from Nasopharynx Updated:  08/13/20 1416     ADENOVIRUS, PCR Not Detected     Coronavirus 229E Not Detected     Coronavirus HKU1 Not Detected     Coronavirus NL63 Not Detected     Coronavirus OC43 Not Detected     COVID19 Detected     Human Metapneumovirus Not Detected     Human Rhinovirus/Enterovirus Not Detected     Influenza A PCR Not Detected     Influenza A H1 Not Detected     Influenza A H1 2009 PCR Not Detected     Influenza A H3 Not Detected     Influenza B PCR Not Detected     Parainfluenza Virus 1 Not Detected     Parainfluenza Virus 2 Not Detected     Parainfluenza Virus 3 Not Detected     Parainfluenza Virus 4 Not Detected     RSV, PCR Not Detected     Bordetella pertussis pcr Not Detected     Bordetella parapertussis PCR Not Detected     Chlamydophila pneumoniae PCR Not Detected     Mycoplasma pneumo by PCR Not Detected    Narrative:       Fact sheet for providers: https://docs.[employer]/wp-content/uploads/HNL5181-6027-PX4.1-EUA-Provider-Fact-Sheet-3.pdf    Fact sheet for patients: https://docs[employer]/wp-content/uploads/QCS0508-9437-CZ4.1-EUA-Patient-Fact-Sheet-1.pdf          IMAGING & OTHER STUDIES    Imaging Results (Last 72 Hours)     ** No results found for the last 72 hours. **                ASSESSMENT/PLAN:     Acute respiratory failure with hypoxia and hypercapnia  (CMS/[facility])    COVID-19    Ascending aortic aneurysm (CMS/[facility])    Essential hypertension    Lymphedema    Vitamin D deficiency    Polyneuropathy    Bipolar 1 disorder (CMS/[facility])    Shortness of breath    Obesity, morbid, BMI 50 or higher (CMS/[facility])  suspected NIGEL/OHS    PLAN:  Wean FiO2 as tolerated.  Now down to 2L NC. Titrate for sats above 90%.      He likely has acute on chronic hypercapnic respiratory failure likely from his obesity hypoventilation syndrome.  We will continue with noninvasive ventilation.  BiPAP was ineffective so he was switched to AVAPS settings. Recommend Sleep study as OP vs trilogy. He is encouraged to use AVAPS every night and as needed during during daytime.    His CT scan of the chest is concerning for pulmonary hypertension.    -Will need further work-up for this pulmonary pretension but likely undiagnosed obesity hypoventilation syndrome and obstructive sleep apnea is playing a role.  Is probably an element of group 2 diastolic heart failure.      Diuresis as appropriate  Continue dexamethasone daily for total of 10 days  Not a candidate for Remdesivir due to length of infection  Continue Proventil and Symbicort  He is on Lovenox for DVT prophylaxis.     Will follow. DC planning back to Rehab.  Okay from pulmonary standpoint.  Patient is instructed to follow-up in our office as outpatient    THIS DOCUMENT HAS BEEN ELECTRONICALLY SIGNED BY  Lino Forrester MD  11:59 AM

## 2020-08-21 NOTE — PROGRESS NOTES
Case Management Discharge Note      Final Note: [facility] for inpt rehab.         Destination - Selection Complete      Service Provider Request Status Selected Services Address Phone Number Fax Number    [facility] Selected Skilled Nursing [address] [place] [address] [phone]                   Final Discharge Disposition Code: 04 - intermediate care facility